# Patient Record
Sex: FEMALE | Race: WHITE | Employment: OTHER | URBAN - METROPOLITAN AREA
[De-identification: names, ages, dates, MRNs, and addresses within clinical notes are randomized per-mention and may not be internally consistent; named-entity substitution may affect disease eponyms.]

---

## 2018-04-10 ENCOUNTER — OFFICE VISIT (OUTPATIENT)
Dept: CARDIOLOGY CLINIC | Age: 78
End: 2018-04-10
Payer: MEDICARE

## 2018-04-10 VITALS
WEIGHT: 193 LBS | RESPIRATION RATE: 12 BRPM | HEART RATE: 52 BPM | SYSTOLIC BLOOD PRESSURE: 120 MMHG | HEIGHT: 61 IN | BODY MASS INDEX: 36.44 KG/M2 | DIASTOLIC BLOOD PRESSURE: 80 MMHG

## 2018-04-10 DIAGNOSIS — I10 ESSENTIAL HYPERTENSION: Primary | ICD-10-CM

## 2018-04-10 DIAGNOSIS — I25.83 CORONARY ARTERY DISEASE DUE TO LIPID RICH PLAQUE: ICD-10-CM

## 2018-04-10 DIAGNOSIS — I25.10 CORONARY ARTERY DISEASE DUE TO LIPID RICH PLAQUE: ICD-10-CM

## 2018-04-10 PROCEDURE — 93000 ELECTROCARDIOGRAM COMPLETE: CPT | Performed by: INTERNAL MEDICINE

## 2018-04-10 PROCEDURE — G8427 DOCREV CUR MEDS BY ELIG CLIN: HCPCS | Performed by: INTERNAL MEDICINE

## 2018-04-10 PROCEDURE — G8598 ASA/ANTIPLAT THER USED: HCPCS | Performed by: INTERNAL MEDICINE

## 2018-04-10 PROCEDURE — 99214 OFFICE O/P EST MOD 30 MIN: CPT | Performed by: INTERNAL MEDICINE

## 2018-04-10 PROCEDURE — 4040F PNEUMOC VAC/ADMIN/RCVD: CPT | Performed by: INTERNAL MEDICINE

## 2018-04-10 PROCEDURE — 1090F PRES/ABSN URINE INCON ASSESS: CPT | Performed by: INTERNAL MEDICINE

## 2018-04-10 PROCEDURE — 1036F TOBACCO NON-USER: CPT | Performed by: INTERNAL MEDICINE

## 2018-04-10 PROCEDURE — G8417 CALC BMI ABV UP PARAM F/U: HCPCS | Performed by: INTERNAL MEDICINE

## 2018-04-10 PROCEDURE — 3017F COLORECTAL CA SCREEN DOC REV: CPT | Performed by: INTERNAL MEDICINE

## 2018-04-10 PROCEDURE — 1123F ACP DISCUSS/DSCN MKR DOCD: CPT | Performed by: INTERNAL MEDICINE

## 2018-04-10 PROCEDURE — G8400 PT W/DXA NO RESULTS DOC: HCPCS | Performed by: INTERNAL MEDICINE

## 2018-04-10 RX ORDER — CLOPIDOGREL BISULFATE 75 MG/1
75 TABLET ORAL DAILY
Qty: 90 TABLET | Refills: 3 | Status: SHIPPED | OUTPATIENT
Start: 2018-04-10 | End: 2019-04-05 | Stop reason: SDUPTHER

## 2019-04-09 RX ORDER — CLOPIDOGREL BISULFATE 75 MG/1
TABLET ORAL
Qty: 90 TABLET | Refills: 3 | Status: SHIPPED
Start: 2019-04-09 | End: 2020-04-01

## 2019-04-16 ENCOUNTER — OFFICE VISIT (OUTPATIENT)
Dept: CARDIOLOGY CLINIC | Age: 79
End: 2019-04-16
Payer: MEDICARE

## 2019-04-16 VITALS
WEIGHT: 193.2 LBS | BODY MASS INDEX: 35.55 KG/M2 | SYSTOLIC BLOOD PRESSURE: 100 MMHG | DIASTOLIC BLOOD PRESSURE: 60 MMHG | RESPIRATION RATE: 16 BRPM | HEIGHT: 62 IN | HEART RATE: 52 BPM

## 2019-04-16 DIAGNOSIS — I25.83 CORONARY ARTERY DISEASE DUE TO LIPID RICH PLAQUE: Primary | ICD-10-CM

## 2019-04-16 DIAGNOSIS — I25.10 CORONARY ARTERY DISEASE DUE TO LIPID RICH PLAQUE: Primary | ICD-10-CM

## 2019-04-16 PROCEDURE — 4040F PNEUMOC VAC/ADMIN/RCVD: CPT | Performed by: INTERNAL MEDICINE

## 2019-04-16 PROCEDURE — 1123F ACP DISCUSS/DSCN MKR DOCD: CPT | Performed by: INTERNAL MEDICINE

## 2019-04-16 PROCEDURE — G8400 PT W/DXA NO RESULTS DOC: HCPCS | Performed by: INTERNAL MEDICINE

## 2019-04-16 PROCEDURE — 99213 OFFICE O/P EST LOW 20 MIN: CPT | Performed by: INTERNAL MEDICINE

## 2019-04-16 PROCEDURE — G8427 DOCREV CUR MEDS BY ELIG CLIN: HCPCS | Performed by: INTERNAL MEDICINE

## 2019-04-16 PROCEDURE — G8598 ASA/ANTIPLAT THER USED: HCPCS | Performed by: INTERNAL MEDICINE

## 2019-04-16 PROCEDURE — G8417 CALC BMI ABV UP PARAM F/U: HCPCS | Performed by: INTERNAL MEDICINE

## 2019-04-16 PROCEDURE — 1036F TOBACCO NON-USER: CPT | Performed by: INTERNAL MEDICINE

## 2019-04-16 PROCEDURE — 1090F PRES/ABSN URINE INCON ASSESS: CPT | Performed by: INTERNAL MEDICINE

## 2019-04-16 PROCEDURE — 93000 ELECTROCARDIOGRAM COMPLETE: CPT | Performed by: INTERNAL MEDICINE

## 2019-04-16 RX ORDER — NITROGLYCERIN 0.4 MG/1
0.4 TABLET SUBLINGUAL EVERY 5 MIN PRN
Qty: 25 TABLET | Refills: 3 | Status: ON HOLD
Start: 2019-04-16 | End: 2022-08-10

## 2019-04-16 RX ORDER — IRBESARTAN 150 MG/1
150 TABLET ORAL NIGHTLY
Status: ON HOLD | COMMUNITY
End: 2022-08-10

## 2019-04-17 NOTE — PROGRESS NOTES
Neto Barrett  1940  Date of Service: 4/17/2019    Patient Active Problem List    Diagnosis Date Noted    Hypertension     Diabetes mellitus ([facility] [address]     CAD (coronary artery disease) 08/12/2015     Overview Note:     7-2015  2.75 x 18-mm Xience drug-eluting stent to the ostial and proximal 1st diagonal.  2.75 x 18-mm Xience drug-eluting stent to the proximal and mid LAD.    5-16   3.0x18 KARINE prox LAD      Hyperlipidemia 08/12/2015    Cholecystitis 12/09/2013       Social History     Socioeconomic History    Marital status:       Spouse name: None    Number of children: None    Years of education: None    Highest education level: None   Occupational History    None   Social Needs    Financial resource strain: None    Food insecurity:     Worry: None     Inability: None    Transportation needs:     Medical: None     Non-medical: None   Tobacco Use    Smoking status: Former Smoker     Types: Cigarettes    Smokeless tobacco: Never Used   Substance and Sexual Activity    Alcohol use: No    Drug use: No    Sexual activity: None   Lifestyle    Physical activity:     Days per week: None     Minutes per session: None    Stress: None   Relationships    Social connections:     Talks on phone: None     Gets together: None     Attends Christianity service: None     Active member of club or organization: None     Attends meetings of clubs or organizations: None     Relationship status: None    Intimate partner violence:     Fear of current or ex partner: None     Emotionally abused: None     Physically abused: None     Forced sexual activity: None   Other Topics Concern    None   Social History Narrative    None       Current Outpatient Medications   Medication Sig Dispense Refill    irbesartan (AVAPRO) 150 MG tablet Take 150 mg by mouth nightly      Insulin Degludec (TRESIBA FLEXTOUCH) 200 UNIT/ML SOPN Inject into the skin      nitroGLYCERIN (NITROSTAT) 0.4 MG SL tablet Place 1 tablet No masses, trachea is mid position. Supple, full ROM, no JVD or bruits. No palpable thyromegaly or lymphadenopathy. HEART:  Regular rate and rhythm. Normal S1 and S2. There is an S4 gallop and a I/VI (normal physiologic) systolic murmur. LUNGS:  Clear to auscultation bilaterally. No use of accessory muscles. symmetrical excursion. ABDOMEN:  Soft, non-tender. Normal bowel sounds. Obese. EXTREMITIES:  Full ROM x 4. No bilateral lower extremity edema. Good distal pulses. EYES:  Extraocular muscles intact. PERRL. Normal lids & conjunctiva. ENT:  Nares are clear & not bleeding. Moist mucosa. Normal lips formation. No external masses   NEURO: no tremors, full ROM x 4, EOMI. SKIN:  Warm, dry and intact. Normal turgor. EKG: Sinus, 52 bpm, nl axis, nonspecific ST - T wave changes. Inferior q-waves. Poor R-wave progression. Assessment:   1. Coronary artery disease as outlined above. She denies any ischemic symptoms at this time. 2. Hypertension, a little too low today. 3. Hypercholesterolemia  4. DM        Recommendations:  1. She is following the cholesterol with Dr. Magalis Sullivan. 2. Discontinue the Imdur      Thank you for allowing me to participate in your patient's care.       [id number], [address] [facility]  Interventional Cardiology

## 2020-04-01 RX ORDER — CLOPIDOGREL BISULFATE 75 MG/1
TABLET ORAL
Qty: 90 TABLET | Refills: 3 | Status: ON HOLD
Start: 2020-04-01 | End: 2022-04-21 | Stop reason: HOSPADM

## 2020-05-06 ENCOUNTER — TELEPHONE (OUTPATIENT)
Dept: CARDIOLOGY CLINIC | Age: 80
End: 2020-05-06

## 2020-07-29 ENCOUNTER — OFFICE VISIT (OUTPATIENT)
Dept: CARDIOLOGY CLINIC | Age: 80
End: 2020-07-29
Payer: MEDICARE

## 2020-07-29 VITALS
RESPIRATION RATE: 18 BRPM | WEIGHT: 182 LBS | HEART RATE: 54 BPM | DIASTOLIC BLOOD PRESSURE: 58 MMHG | HEIGHT: 62 IN | BODY MASS INDEX: 33.49 KG/M2 | SYSTOLIC BLOOD PRESSURE: 122 MMHG

## 2020-07-29 PROCEDURE — 4040F PNEUMOC VAC/ADMIN/RCVD: CPT | Performed by: INTERNAL MEDICINE

## 2020-07-29 PROCEDURE — 1036F TOBACCO NON-USER: CPT | Performed by: INTERNAL MEDICINE

## 2020-07-29 PROCEDURE — G8417 CALC BMI ABV UP PARAM F/U: HCPCS | Performed by: INTERNAL MEDICINE

## 2020-07-29 PROCEDURE — 99213 OFFICE O/P EST LOW 20 MIN: CPT | Performed by: INTERNAL MEDICINE

## 2020-07-29 PROCEDURE — G8400 PT W/DXA NO RESULTS DOC: HCPCS | Performed by: INTERNAL MEDICINE

## 2020-07-29 PROCEDURE — 93000 ELECTROCARDIOGRAM COMPLETE: CPT | Performed by: INTERNAL MEDICINE

## 2020-07-29 PROCEDURE — 1090F PRES/ABSN URINE INCON ASSESS: CPT | Performed by: INTERNAL MEDICINE

## 2020-07-29 PROCEDURE — G8427 DOCREV CUR MEDS BY ELIG CLIN: HCPCS | Performed by: INTERNAL MEDICINE

## 2020-07-29 PROCEDURE — 1123F ACP DISCUSS/DSCN MKR DOCD: CPT | Performed by: INTERNAL MEDICINE

## 2020-07-29 NOTE — PROGRESS NOTES
Tony Lopez  1940  Date of Service: 7/29/2020    Patient Active Problem List    Diagnosis Date Noted    Hypertension     Diabetes mellitus ([facility] [address]     CAD (coronary artery disease) 08/12/2015     Overview Note:     7-2015  2.75 x 18-mm Xience drug-eluting stent to the ostial and proximal 1st diagonal.  2.75 x 18-mm Xience drug-eluting stent to the proximal and mid LAD.    5-16   3.0x18 KARINE prox LAD      Hyperlipidemia 08/12/2015    Cholecystitis 12/09/2013       Social History     Socioeconomic History    Marital status:       Spouse name: None    Number of children: None    Years of education: None    Highest education level: None   Occupational History    None   Social Needs    Financial resource strain: None    Food insecurity     Worry: None     Inability: None    Transportation needs     Medical: None     Non-medical: None   Tobacco Use    Smoking status: Former Smoker     Types: Cigarettes    Smokeless tobacco: Never Used   Substance and Sexual Activity    Alcohol use: No    Drug use: No    Sexual activity: None   Lifestyle    Physical activity     Days per week: None     Minutes per session: None    Stress: None   Relationships    Social connections     Talks on phone: None     Gets together: None     Attends Lutheran service: None     Active member of club or organization: None     Attends meetings of clubs or organizations: None     Relationship status: None    Intimate partner violence     Fear of current or ex partner: None     Emotionally abused: None     Physically abused: None     Forced sexual activity: None   Other Topics Concern    None   Social History Narrative    None       Current Outpatient Medications   Medication Sig Dispense Refill    clopidogrel (PLAVIX) 75 MG tablet TAKE 1 TABLET BY MOUTH ONCE A DAY 90 tablet 3    irbesartan (AVAPRO) 150 MG tablet Take 150 mg by mouth nightly      Insulin Degludec (TRESIBA FLEXTOUCH) 200 UNIT/ML SOPN Inject 70 Units into the skin daily       nitroGLYCERIN (NITROSTAT) 0.4 MG SL tablet Place 1 tablet under the tongue every 5 minutes as needed for Chest pain 25 tablet 3    insulin lispro (HUMALOG KWIKPEN) 100 UNIT/ML pen Inject into the skin 3 times daily (before meals)      INVOKANA 300 MG TABS tablet       atenolol (TENORMIN) 50 MG tablet Take 1 tablet by mouth daily 30 tablet 3    ALPRAZolam (XANAX) 1 MG tablet Take 1 mg by mouth nightly as needed for Sleep      atorvastatin (LIPITOR) 40 MG tablet Take 40 mg by mouth daily       aspirin 81 MG EC tablet Take 1 tablet by mouth daily 30 tablet 3    metFORMIN (GLUCOPHAGE) 1000 MG tablet Take 1 tablet by mouth 2 times daily (with meals). 60 tablet 1    citalopram (CELEXA) 20 MG tablet Take 40 mg by mouth daily        No current facility-administered medications for this visit. Allergies   Allergen Reactions    Pcn [Penicillins] Hives    Zocor [Simvastatin] Hives       Chief Complaint:  Merrick Angel is here today for follow up and management/recomendations for CAD     History of Present Illness: Merrick Angel states that She does house work, does yard work, goes up the stairs, & goes shopping. She denies any chest pain, dyspnea on exertion, orthopnea/PND, or lower extremity edema. She denies any palpitations or presyncopal symptoms. REVIEW OF SYSTEMS:  As above. Patient does not complain of any fever, chills, nausea, vomiting or diarrhea. No focal, motor or neurological deficits. No changes in his/her vision, hearing, bowel or bladder habits. She is not known to have a history of thyroid problems. No recent nose bleeds. PHYSICAL EXAM:  Vitals:    07/29/20 1041   BP: (!) 122/58   Pulse: 54   Resp: 18   Weight: 182 lb (82.6 kg)   Height: 5' 2\" (1.575 m)       GENERAL:  She is alert and oriented x 3, communicates well, in no distress. NECK:  No masses, trachea is mid position. Supple, full ROM, no JVD or bruits.   No palpable thyromegaly or lymphadenopathy. HEART:  Regular rate and rhythm. Normal S1 and S2. There are no abnormal murmurs. Dariana Hines LUNGS:  Clear to auscultation bilaterally. No use of accessory muscles. symmetrical excursion. Good effort. ABDOMEN: Obese. Soft, non-tender. Normal bowel sounds. EXTREMITIES:  Full ROM x 4. No bilateral lower extremity edema. Good distal pulses. EYES:  Extraocular muscles intact. PERRL. Normal lids & conjunctiva. ENT:  Nares are clear & not bleeding. Moist mucosa. Normal lips formation. No external masses   NEURO: no tremors, full ROM x 4, EOMI. SKIN:  Warm, dry and intact. Normal turgor. EKG: Sinus, 54 bpm, nl axis, inferior Q waves. Nonspecific ST - T wave changes. Poor R-wave progression. Assessment:   1. Coronary artery disease as outlined above. No ischemic symptoms at this time. 2. Hypertension, well controlled today. 3. Hypercholesterolemia  4. DM        Recommendations:  1. She is following the cholesterol with Dr. Harini Wilson. 2. Discontinue the aspirin. Continue the Plavix. Thank you for allowing me to participate in your patient's care.       [id number], [address] [facility]  Interventional Cardiology

## 2021-09-27 ENCOUNTER — OFFICE VISIT (OUTPATIENT)
Dept: CARDIOLOGY CLINIC | Age: 81
End: 2021-09-27
Payer: MEDICARE

## 2021-09-27 VITALS
BODY MASS INDEX: 33.99 KG/M2 | SYSTOLIC BLOOD PRESSURE: 118 MMHG | WEIGHT: 180 LBS | HEART RATE: 53 BPM | RESPIRATION RATE: 14 BRPM | DIASTOLIC BLOOD PRESSURE: 62 MMHG | HEIGHT: 61 IN

## 2021-09-27 DIAGNOSIS — I25.10 CORONARY ARTERY DISEASE DUE TO LIPID RICH PLAQUE: Primary | ICD-10-CM

## 2021-09-27 DIAGNOSIS — I25.83 CORONARY ARTERY DISEASE DUE TO LIPID RICH PLAQUE: Primary | ICD-10-CM

## 2021-09-27 PROCEDURE — G8417 CALC BMI ABV UP PARAM F/U: HCPCS | Performed by: INTERNAL MEDICINE

## 2021-09-27 PROCEDURE — 99213 OFFICE O/P EST LOW 20 MIN: CPT | Performed by: INTERNAL MEDICINE

## 2021-09-27 PROCEDURE — 93000 ELECTROCARDIOGRAM COMPLETE: CPT | Performed by: INTERNAL MEDICINE

## 2021-09-27 PROCEDURE — 1036F TOBACCO NON-USER: CPT | Performed by: INTERNAL MEDICINE

## 2021-09-27 PROCEDURE — 4040F PNEUMOC VAC/ADMIN/RCVD: CPT | Performed by: INTERNAL MEDICINE

## 2021-09-27 PROCEDURE — G8400 PT W/DXA NO RESULTS DOC: HCPCS | Performed by: INTERNAL MEDICINE

## 2021-09-27 PROCEDURE — 1123F ACP DISCUSS/DSCN MKR DOCD: CPT | Performed by: INTERNAL MEDICINE

## 2021-09-27 PROCEDURE — G8427 DOCREV CUR MEDS BY ELIG CLIN: HCPCS | Performed by: INTERNAL MEDICINE

## 2021-09-27 PROCEDURE — 1090F PRES/ABSN URINE INCON ASSESS: CPT | Performed by: INTERNAL MEDICINE

## 2021-09-27 RX ORDER — ALPRAZOLAM 0.5 MG/1
1 TABLET ORAL 2 TIMES DAILY
Status: ON HOLD | COMMUNITY
Start: 2021-09-21 | End: 2022-08-16 | Stop reason: HOSPADM

## 2021-09-27 RX ORDER — FLUCONAZOLE 150 MG/1
TABLET ORAL
COMMUNITY
Start: 2021-09-07 | End: 2021-09-27

## 2021-09-27 NOTE — PROGRESS NOTES
Obie Virginia  1940  Date of Service: 9/27/2021    Patient Active Problem List    Diagnosis Date Noted    Hypertension     Diabetes mellitus ([facility] [address]     CAD (coronary artery disease) 08/12/2015     Overview Note:     7-2015  2.75 x 18-mm Xience drug-eluting stent to the ostial and proximal 1st diagonal.  2.75 x 18-mm Xience drug-eluting stent to the proximal and mid LAD.    5-16   3.0x18 KARINE prox LAD      Hyperlipidemia 08/12/2015    Cholecystitis 12/09/2013       Social History     Socioeconomic History    Marital status:      Spouse name: None    Number of children: None    Years of education: None    Highest education level: None   Occupational History    None   Tobacco Use    Smoking status: Former Smoker     Types: Cigarettes    Smokeless tobacco: Never Used   Vaping Use    Vaping Use: Never used   Substance and Sexual Activity    Alcohol use: No    Drug use: No    Sexual activity: None   Other Topics Concern    None   Social History Narrative    None     Social Determinants of Health     Financial Resource Strain:     Difficulty of Paying Living Expenses:    Food Insecurity:     Worried About Running Out of Food in the Last Year:     Ran Out of Food in the Last Year:    Transportation Needs:     Lack of Transportation (Medical):      Lack of Transportation (Non-Medical):    Physical Activity:     Days of Exercise per Week:     Minutes of Exercise per Session:    Stress:     Feeling of Stress :    Social Connections:     Frequency of Communication with Friends and Family:     Frequency of Social Gatherings with Friends and Family:     Attends [employer] Services:     Active Member of Clubs or Organizations:     Attends Club or Organization Meetings:     Marital Status:    Intimate Partner Violence:     Fear of Current or Ex-Partner:     Emotionally Abused:     Physically Abused:     Sexually Abused:        Current Outpatient Medications   Medication Sig Dispense Refill    ALPRAZolam (XANAX) 0.5 MG tablet TAKE 1/2 TABLET BY MOUTH EVERY 12 HOURS FOR 90 DAYS      clopidogrel (PLAVIX) 75 MG tablet TAKE 1 TABLET BY MOUTH ONCE A DAY 90 tablet 3    irbesartan (AVAPRO) 150 MG tablet Take 150 mg by mouth nightly      Insulin Degludec (TRESIBA FLEXTOUCH) 200 UNIT/ML SOPN Inject 70 Units into the skin daily       nitroGLYCERIN (NITROSTAT) 0.4 MG SL tablet Place 1 tablet under the tongue every 5 minutes as needed for Chest pain 25 tablet 3    insulin lispro (HUMALOG KWIKPEN) 100 UNIT/ML pen Inject into the skin 3 times daily (before meals)      INVOKANA 300 MG TABS tablet       atenolol (TENORMIN) 50 MG tablet Take 1 tablet by mouth daily 30 tablet 3    atorvastatin (LIPITOR) 40 MG tablet Take 40 mg by mouth daily       metFORMIN (GLUCOPHAGE) 1000 MG tablet Take 1 tablet by mouth 2 times daily (with meals). 60 tablet 1    citalopram (CELEXA) 20 MG tablet Take 40 mg by mouth daily       fluconazole (DIFLUCAN) 150 MG tablet TAKE 1 TABLET BY MOUTH  REPEAT EVERY 3 DAYS (Patient not taking: Reported on 9/27/2021)       No current facility-administered medications for this visit. Allergies   Allergen Reactions    Pcn [Penicillins] Hives    Zocor [Simvastatin] Hives       Chief Complaint:  Maximiliano Lopez is here today for follow up and management/recomendations for CAD     History of Present Illness: Maximiliano Lopez states that She does house work, does yard work, goes up the stairs, & goes shopping. She also just ran a retreat this weekend where she had to do a lot of walking, pulling wagons up inclines, etc.   She denies any chest pain, dyspnea on exertion, orthopnea/PND, or lower extremity edema. She denies any palpitations or presyncopal symptoms. REVIEW OF SYSTEMS:  As above. Patient does not complain of any fever, chills, nausea, vomiting or diarrhea. No focal, motor or neurological deficits.  No changes in his/her vision, hearing, bowel or bladder habits. She is not known to have a history of thyroid problems. No recent nose bleeds. PHYSICAL EXAM:  Vitals:    09/27/21 0947   BP: 118/62   Pulse: 53   Resp: 14   Weight: 180 lb (81.6 kg)   Height: 5' 1\" (1.549 m)       GENERAL:  She is alert and oriented x 3, communicates well, in no distress. NECK:  No masses, trachea is mid position. Supple, full ROM, no JVD or bruits. No palpable thyromegaly or lymphadenopathy. HEART:  Regular rate and rhythm. Normal S1 and S2. There are no abnormal murmurs on exam..    LUNGS:  Clear to auscultation bilaterally. No use of accessory muscles. symmetrical excursion. ABDOMEN: Obese. Soft, non-tender. Normal bowel sounds. EXTREMITIES:  Full ROM x 4. No bilateral lower extremity edema. Good distal pulses. EYES:  Extraocular muscles intact. PERRL. Normal lids & conjunctiva. ENT:  Nares are clear & not bleeding. Moist mucosa. Normal lips formation. No external masses   NEURO: no tremors, full ROM x 4, EOMI. SKIN:  Warm, dry and intact. Normal turgor. EKG: Sinus, 53 bpm, nl axis, inferior Q waves. Nonspecific ST - T wave changes. Poor R-wave progression. Assessment:   1. Coronary artery disease as outlined above. No ischemic symptoms. 2. Hypertension, well controlled today. 3. Hypercholesterolemia  4. DM        Recommendations:  1. She is following the cholesterol with Dr. Lillian Talbert. 2. Continue her current cardiac medications. Thank you for allowing me to participate in your patient's care.       [address], [address] [facility]  Interventional Cardiology

## 2021-11-12 ENCOUNTER — HOSPITAL ENCOUNTER (OUTPATIENT)
Dept: INFUSION THERAPY | Age: 81
Setting detail: INFUSION SERIES
Discharge: HOME | End: 2021-11-12
Payer: MEDICARE

## 2021-11-12 VITALS
OXYGEN SATURATION: 95 % | HEART RATE: 54 BPM | DIASTOLIC BLOOD PRESSURE: 66 MMHG | RESPIRATION RATE: 16 BRPM | TEMPERATURE: 97.6 F | SYSTOLIC BLOOD PRESSURE: 140 MMHG

## 2021-11-12 DIAGNOSIS — U07.1 COVID-19: ICD-10-CM

## 2021-11-12 DIAGNOSIS — U07.1 COVID-19: Primary | ICD-10-CM

## 2021-11-12 PROCEDURE — M0243 CASIRIVI AND IMDEVI INFUSION: HCPCS

## 2021-11-12 PROCEDURE — 2580000003 HC RX 258: Performed by: INTERNAL MEDICINE

## 2021-11-12 PROCEDURE — 96365 THER/PROPH/DIAG IV INF INIT: CPT

## 2021-11-12 PROCEDURE — 6360000002 HC RX W HCPCS: Performed by: INTERNAL MEDICINE

## 2021-11-12 RX ORDER — SODIUM CHLORIDE 9 MG/ML
25 INJECTION, SOLUTION INTRAVENOUS PRN
OUTPATIENT
Start: 2021-11-12

## 2021-11-12 RX ORDER — SODIUM CHLORIDE 0.9 % (FLUSH) 0.9 %
5-40 SYRINGE (ML) INJECTION PRN
Status: DISCONTINUED | OUTPATIENT
Start: 2021-11-12 | End: 2021-11-13 | Stop reason: HOSPADM

## 2021-11-12 RX ORDER — METHYLPREDNISOLONE SODIUM SUCCINATE 125 MG/2ML
125 INJECTION, POWDER, LYOPHILIZED, FOR SOLUTION INTRAMUSCULAR; INTRAVENOUS ONCE
OUTPATIENT
Start: 2021-11-12 | End: 2021-11-12

## 2021-11-12 RX ORDER — HEPARIN SODIUM (PORCINE) LOCK FLUSH IV SOLN 100 UNIT/ML 100 UNIT/ML
500 SOLUTION INTRAVENOUS PRN
OUTPATIENT
Start: 2021-11-12

## 2021-11-12 RX ORDER — DIPHENHYDRAMINE HYDROCHLORIDE 50 MG/ML
50 INJECTION INTRAMUSCULAR; INTRAVENOUS ONCE
Status: CANCELLED | OUTPATIENT
Start: 2021-11-12 | End: 2021-11-12

## 2021-11-12 RX ORDER — EPINEPHRINE 1 MG/ML
0.3 INJECTION, SOLUTION, CONCENTRATE INTRAVENOUS PRN
OUTPATIENT
Start: 2021-11-12

## 2021-11-12 RX ORDER — HEPARIN SODIUM (PORCINE) LOCK FLUSH IV SOLN 100 UNIT/ML 100 UNIT/ML
500 SOLUTION INTRAVENOUS PRN
Status: CANCELLED | OUTPATIENT
Start: 2021-11-12

## 2021-11-12 RX ORDER — EPINEPHRINE 1 MG/ML
0.3 INJECTION, SOLUTION, CONCENTRATE INTRAVENOUS PRN
Status: CANCELLED | OUTPATIENT
Start: 2021-11-12

## 2021-11-12 RX ORDER — SODIUM CHLORIDE 9 MG/ML
INJECTION, SOLUTION INTRAVENOUS CONTINUOUS
Status: CANCELLED | OUTPATIENT
Start: 2021-11-12

## 2021-11-12 RX ORDER — SODIUM CHLORIDE 0.9 % (FLUSH) 0.9 %
5-40 SYRINGE (ML) INJECTION PRN
Status: CANCELLED | OUTPATIENT
Start: 2021-11-12

## 2021-11-12 RX ORDER — SODIUM CHLORIDE 9 MG/ML
25 INJECTION, SOLUTION INTRAVENOUS PRN
Status: CANCELLED | OUTPATIENT
Start: 2021-11-12

## 2021-11-12 RX ORDER — SODIUM CHLORIDE 9 MG/ML
INJECTION, SOLUTION INTRAVENOUS CONTINUOUS
OUTPATIENT
Start: 2021-11-12

## 2021-11-12 RX ORDER — METHYLPREDNISOLONE SODIUM SUCCINATE 125 MG/2ML
125 INJECTION, POWDER, LYOPHILIZED, FOR SOLUTION INTRAMUSCULAR; INTRAVENOUS ONCE
Status: CANCELLED | OUTPATIENT
Start: 2021-11-12 | End: 2021-11-12

## 2021-11-12 RX ORDER — DIPHENHYDRAMINE HYDROCHLORIDE 50 MG/ML
50 INJECTION INTRAMUSCULAR; INTRAVENOUS ONCE
OUTPATIENT
Start: 2021-11-12 | End: 2021-11-12

## 2021-11-12 RX ORDER — SODIUM CHLORIDE 9 MG/ML
INJECTION, SOLUTION INTRAVENOUS CONTINUOUS
Status: DISCONTINUED | OUTPATIENT
Start: 2021-11-12 | End: 2021-11-13 | Stop reason: HOSPADM

## 2021-11-12 RX ADMIN — SODIUM CHLORIDE, PRESERVATIVE FREE 10 ML: 5 INJECTION INTRAVENOUS at 18:21

## 2021-11-12 RX ADMIN — SODIUM CHLORIDE: 9 INJECTION, SOLUTION INTRAVENOUS at 17:45

## 2021-11-12 RX ADMIN — SODIUM CHLORIDE, PRESERVATIVE FREE 10 ML: 5 INJECTION INTRAVENOUS at 17:43

## 2021-11-12 RX ADMIN — CASIRIVIMAB AND IMDEVIMAB: 600; 600 INJECTION, SOLUTION, CONCENTRATE INTRAVENOUS at 17:47

## 2022-04-18 ENCOUNTER — APPOINTMENT (OUTPATIENT)
Dept: GENERAL RADIOLOGY | Age: 82
DRG: 378 | End: 2022-04-18
Payer: MEDICARE

## 2022-04-18 ENCOUNTER — HOSPITAL ENCOUNTER (INPATIENT)
Age: 82
LOS: 2 days | Discharge: HOME | DRG: 378 | End: 2022-04-21
Attending: EMERGENCY MEDICINE | Admitting: INTERNAL MEDICINE
Payer: MEDICARE

## 2022-04-18 DIAGNOSIS — K04.7 DENTAL ABSCESS: ICD-10-CM

## 2022-04-18 DIAGNOSIS — R42 DIZZINESS: ICD-10-CM

## 2022-04-18 DIAGNOSIS — D64.9 ANEMIA, UNSPECIFIED TYPE: ICD-10-CM

## 2022-04-18 DIAGNOSIS — R11.0 NAUSEA: Primary | ICD-10-CM

## 2022-04-18 PROBLEM — Z86.16 HISTORY OF COVID-19: Status: ACTIVE | Noted: 2021-11-12

## 2022-04-18 LAB
ALBUMIN SERPL-MCNC: 3.7 G/DL (ref 3.5–5.2)
ALP BLD-CCNC: 68 U/L (ref 35–104)
ALT SERPL-CCNC: 16 U/L (ref 0–32)
ANION GAP SERPL CALCULATED.3IONS-SCNC: 12 MMOL/L (ref 7–16)
AST SERPL-CCNC: 19 U/L (ref 0–31)
BASOPHILS ABSOLUTE: 0.05 E9/L (ref 0–0.2)
BASOPHILS RELATIVE PERCENT: 0.4 % (ref 0–2)
BILIRUB SERPL-MCNC: 0.3 MG/DL (ref 0–1.2)
BILIRUBIN DIRECT: <0.2 MG/DL (ref 0–0.3)
BILIRUBIN, INDIRECT: ABNORMAL MG/DL (ref 0–1)
BUN BLDV-MCNC: 59 MG/DL (ref 6–23)
CALCIUM SERPL-MCNC: 9.3 MG/DL (ref 8.6–10.2)
CHLORIDE BLD-SCNC: 102 MMOL/L (ref 98–107)
CO2: 24 MMOL/L (ref 22–29)
CREAT SERPL-MCNC: 1 MG/DL (ref 0.5–1)
EOSINOPHILS ABSOLUTE: 0.06 E9/L (ref 0.05–0.5)
EOSINOPHILS RELATIVE PERCENT: 0.5 % (ref 0–6)
GFR AFRICAN AMERICAN: >60
GFR NON-AFRICAN AMERICAN: 53 ML/MIN/1.73
GLUCOSE BLD-MCNC: 201 MG/DL (ref 74–99)
HCT VFR BLD CALC: 29.3 % (ref 34–48)
HCT VFR BLD CALC: 31.4 % (ref 34–48)
HEMOGLOBIN: 9 G/DL (ref 11.5–15.5)
HEMOGLOBIN: 9.9 G/DL (ref 11.5–15.5)
IMMATURE GRANULOCYTES #: 0.05 E9/L
IMMATURE GRANULOCYTES %: 0.4 % (ref 0–5)
LIPASE: 28 U/L (ref 13–60)
LYMPHOCYTES ABSOLUTE: 2.09 E9/L (ref 1.5–4)
LYMPHOCYTES RELATIVE PERCENT: 16.9 % (ref 20–42)
MCH RBC QN AUTO: 29.6 PG (ref 26–35)
MCHC RBC AUTO-ENTMCNC: 31.5 % (ref 32–34.5)
MCV RBC AUTO: 93.7 FL (ref 80–99.9)
MONOCYTES ABSOLUTE: 0.47 E9/L (ref 0.1–0.95)
MONOCYTES RELATIVE PERCENT: 3.8 % (ref 2–12)
NEUTROPHILS ABSOLUTE: 9.67 E9/L (ref 1.8–7.3)
NEUTROPHILS RELATIVE PERCENT: 78 % (ref 43–80)
PDW BLD-RTO: 14.4 FL (ref 11.5–15)
PLATELET # BLD: 215 E9/L (ref 130–450)
PMV BLD AUTO: 10.5 FL (ref 7–12)
POTASSIUM SERPL-SCNC: 4.9 MMOL/L (ref 3.5–5)
RBC # BLD: 3.35 E12/L (ref 3.5–5.5)
SODIUM BLD-SCNC: 138 MMOL/L (ref 132–146)
TOTAL PROTEIN: 5.9 G/DL (ref 6.4–8.3)
TROPONIN, HIGH SENSITIVITY: 13 NG/L (ref 0–9)
WBC # BLD: 12.4 E9/L (ref 4.5–11.5)

## 2022-04-18 PROCEDURE — 85014 HEMATOCRIT: CPT

## 2022-04-18 PROCEDURE — 83690 ASSAY OF LIPASE: CPT

## 2022-04-18 PROCEDURE — 84484 ASSAY OF TROPONIN QUANT: CPT

## 2022-04-18 PROCEDURE — 85025 COMPLETE CBC W/AUTO DIFF WBC: CPT

## 2022-04-18 PROCEDURE — 80076 HEPATIC FUNCTION PANEL: CPT

## 2022-04-18 PROCEDURE — 93005 ELECTROCARDIOGRAM TRACING: CPT | Performed by: EMERGENCY MEDICINE

## 2022-04-18 PROCEDURE — 99285 EMERGENCY DEPT VISIT HI MDM: CPT

## 2022-04-18 PROCEDURE — 85018 HEMOGLOBIN: CPT

## 2022-04-18 PROCEDURE — 80048 BASIC METABOLIC PNL TOTAL CA: CPT

## 2022-04-18 PROCEDURE — 6360000002 HC RX W HCPCS: Performed by: EMERGENCY MEDICINE

## 2022-04-18 PROCEDURE — 96374 THER/PROPH/DIAG INJ IV PUSH: CPT

## 2022-04-18 PROCEDURE — 71045 X-RAY EXAM CHEST 1 VIEW: CPT

## 2022-04-18 RX ORDER — ALPRAZOLAM 0.25 MG/1
0.5 TABLET ORAL ONCE
Status: COMPLETED | OUTPATIENT
Start: 2022-04-19 | End: 2022-04-19

## 2022-04-18 RX ORDER — SODIUM CHLORIDE 0.9 % (FLUSH) 0.9 %
10 SYRINGE (ML) INJECTION PRN
Status: DISCONTINUED | OUTPATIENT
Start: 2022-04-18 | End: 2022-04-19

## 2022-04-18 RX ORDER — ACETAMINOPHEN 325 MG/1
650 TABLET ORAL ONCE
Status: COMPLETED | OUTPATIENT
Start: 2022-04-19 | End: 2022-04-19

## 2022-04-18 RX ORDER — ONDANSETRON 2 MG/ML
4 INJECTION INTRAMUSCULAR; INTRAVENOUS ONCE
Status: COMPLETED | OUTPATIENT
Start: 2022-04-18 | End: 2022-04-18

## 2022-04-18 RX ADMIN — ONDANSETRON 4 MG: 2 INJECTION INTRAMUSCULAR; INTRAVENOUS at 20:52

## 2022-04-18 ASSESSMENT — ENCOUNTER SYMPTOMS
EYE PAIN: 0
ABDOMINAL PAIN: 0
DIARRHEA: 0
VOMITING: 0
COUGH: 0
SINUS PRESSURE: 0
SORE THROAT: 0
NAUSEA: 1
EYE REDNESS: 0
ABDOMINAL DISTENTION: 0
SHORTNESS OF BREATH: 0
WHEEZING: 0
EYE DISCHARGE: 0
BACK PAIN: 0

## 2022-04-19 ENCOUNTER — ANESTHESIA EVENT (OUTPATIENT)
Dept: ENDOSCOPY | Age: 82
DRG: 378 | End: 2022-04-19
Payer: MEDICARE

## 2022-04-19 PROBLEM — R19.5 STOOL GUAIAC POSITIVE: Status: ACTIVE | Noted: 2022-04-19

## 2022-04-19 LAB
ANION GAP SERPL CALCULATED.3IONS-SCNC: 10 MMOL/L (ref 7–16)
BUN BLDV-MCNC: 74 MG/DL (ref 6–23)
CALCIUM SERPL-MCNC: 9.6 MG/DL (ref 8.6–10.2)
CHLORIDE BLD-SCNC: 103 MMOL/L (ref 98–107)
CO2: 26 MMOL/L (ref 22–29)
CREAT SERPL-MCNC: 1 MG/DL (ref 0.5–1)
EKG ATRIAL RATE: 63 BPM
EKG P AXIS: 29 DEGREES
EKG P-R INTERVAL: 150 MS
EKG Q-T INTERVAL: 438 MS
EKG QRS DURATION: 80 MS
EKG QTC CALCULATION (BAZETT): 448 MS
EKG R AXIS: -49 DEGREES
EKG T AXIS: 61 DEGREES
EKG VENTRICULAR RATE: 63 BPM
GFR AFRICAN AMERICAN: >60
GFR NON-AFRICAN AMERICAN: 53 ML/MIN/1.73
GLUCOSE BLD-MCNC: 149 MG/DL (ref 74–99)
HBA1C MFR BLD: 6.3 % (ref 4–5.6)
HCT VFR BLD CALC: 25 % (ref 34–48)
HCT VFR BLD CALC: 28.9 % (ref 34–48)
HEMOGLOBIN: 7.8 G/DL (ref 11.5–15.5)
HEMOGLOBIN: 9.1 G/DL (ref 11.5–15.5)
IRON SATURATION: 24 % (ref 15–50)
IRON: 60 MCG/DL (ref 37–145)
METER GLUCOSE: 122 MG/DL (ref 74–99)
METER GLUCOSE: 129 MG/DL (ref 74–99)
METER GLUCOSE: 133 MG/DL (ref 74–99)
METER GLUCOSE: 150 MG/DL (ref 74–99)
METER GLUCOSE: 164 MG/DL (ref 74–99)
POTASSIUM SERPL-SCNC: 4.5 MMOL/L (ref 3.5–5)
SODIUM BLD-SCNC: 139 MMOL/L (ref 132–146)
TOTAL IRON BINDING CAPACITY: 252 MCG/DL (ref 250–450)
VITAMIN B-12: 188 PG/ML (ref 211–946)

## 2022-04-19 PROCEDURE — 6370000000 HC RX 637 (ALT 250 FOR IP): Performed by: FAMILY MEDICINE

## 2022-04-19 PROCEDURE — 6360000002 HC RX W HCPCS: Performed by: FAMILY MEDICINE

## 2022-04-19 PROCEDURE — 80048 BASIC METABOLIC PNL TOTAL CA: CPT

## 2022-04-19 PROCEDURE — 83540 ASSAY OF IRON: CPT

## 2022-04-19 PROCEDURE — 36415 COLL VENOUS BLD VENIPUNCTURE: CPT

## 2022-04-19 PROCEDURE — 85018 HEMOGLOBIN: CPT

## 2022-04-19 PROCEDURE — 83550 IRON BINDING TEST: CPT

## 2022-04-19 PROCEDURE — 82607 VITAMIN B-12: CPT

## 2022-04-19 PROCEDURE — 82962 GLUCOSE BLOOD TEST: CPT

## 2022-04-19 PROCEDURE — C9113 INJ PANTOPRAZOLE SODIUM, VIA: HCPCS | Performed by: FAMILY MEDICINE

## 2022-04-19 PROCEDURE — 93010 ELECTROCARDIOGRAM REPORT: CPT | Performed by: INTERNAL MEDICINE

## 2022-04-19 PROCEDURE — 85014 HEMATOCRIT: CPT

## 2022-04-19 PROCEDURE — A4216 STERILE WATER/SALINE, 10 ML: HCPCS | Performed by: FAMILY MEDICINE

## 2022-04-19 PROCEDURE — 2060000000 HC ICU INTERMEDIATE R&B

## 2022-04-19 PROCEDURE — 6370000000 HC RX 637 (ALT 250 FOR IP): Performed by: EMERGENCY MEDICINE

## 2022-04-19 PROCEDURE — 2580000003 HC RX 258: Performed by: FAMILY MEDICINE

## 2022-04-19 PROCEDURE — 83036 HEMOGLOBIN GLYCOSYLATED A1C: CPT

## 2022-04-19 RX ORDER — ONDANSETRON 2 MG/ML
4 INJECTION INTRAMUSCULAR; INTRAVENOUS EVERY 6 HOURS PRN
Status: DISCONTINUED | OUTPATIENT
Start: 2022-04-19 | End: 2022-04-21 | Stop reason: HOSPADM

## 2022-04-19 RX ORDER — AZITHROMYCIN 250 MG/1
250 TABLET, FILM COATED ORAL DAILY
Status: DISCONTINUED | OUTPATIENT
Start: 2022-04-20 | End: 2022-04-21 | Stop reason: HOSPADM

## 2022-04-19 RX ORDER — AZITHROMYCIN 250 MG/1
500 TABLET, FILM COATED ORAL DAILY
Status: COMPLETED | OUTPATIENT
Start: 2022-04-19 | End: 2022-04-19

## 2022-04-19 RX ORDER — INSULIN GLARGINE 100 [IU]/ML
56 INJECTION, SOLUTION SUBCUTANEOUS DAILY
Status: DISCONTINUED | OUTPATIENT
Start: 2022-04-19 | End: 2022-04-19

## 2022-04-19 RX ORDER — PANTOPRAZOLE SODIUM 40 MG/1
40 TABLET, DELAYED RELEASE ORAL
Status: DISCONTINUED | OUTPATIENT
Start: 2022-04-19 | End: 2022-04-19

## 2022-04-19 RX ORDER — CITALOPRAM 20 MG/1
40 TABLET ORAL DAILY
Status: DISCONTINUED | OUTPATIENT
Start: 2022-04-19 | End: 2022-04-21 | Stop reason: HOSPADM

## 2022-04-19 RX ORDER — POLYETHYLENE GLYCOL 3350 17 G/17G
17 POWDER, FOR SOLUTION ORAL DAILY PRN
Status: DISCONTINUED | OUTPATIENT
Start: 2022-04-19 | End: 2022-04-21 | Stop reason: HOSPADM

## 2022-04-19 RX ORDER — DEXTROSE MONOHYDRATE 50 MG/ML
100 INJECTION, SOLUTION INTRAVENOUS PRN
Status: DISCONTINUED | OUTPATIENT
Start: 2022-04-19 | End: 2022-04-21 | Stop reason: HOSPADM

## 2022-04-19 RX ORDER — ACETAMINOPHEN 325 MG/1
650 TABLET ORAL EVERY 6 HOURS PRN
Status: DISCONTINUED | OUTPATIENT
Start: 2022-04-19 | End: 2022-04-21 | Stop reason: HOSPADM

## 2022-04-19 RX ORDER — ATENOLOL 50 MG/1
50 TABLET ORAL DAILY
Status: DISCONTINUED | OUTPATIENT
Start: 2022-04-19 | End: 2022-04-21 | Stop reason: HOSPADM

## 2022-04-19 RX ORDER — NITROGLYCERIN 0.4 MG/1
0.4 TABLET SUBLINGUAL EVERY 5 MIN PRN
Status: DISCONTINUED | OUTPATIENT
Start: 2022-04-19 | End: 2022-04-21 | Stop reason: HOSPADM

## 2022-04-19 RX ORDER — NICOTINE POLACRILEX 4 MG
15 LOZENGE BUCCAL PRN
Status: DISCONTINUED | OUTPATIENT
Start: 2022-04-19 | End: 2022-04-21 | Stop reason: HOSPADM

## 2022-04-19 RX ORDER — ONDANSETRON 4 MG/1
4 TABLET, ORALLY DISINTEGRATING ORAL EVERY 8 HOURS PRN
Status: DISCONTINUED | OUTPATIENT
Start: 2022-04-19 | End: 2022-04-21 | Stop reason: HOSPADM

## 2022-04-19 RX ORDER — ACETAMINOPHEN 650 MG/1
650 SUPPOSITORY RECTAL EVERY 6 HOURS PRN
Status: DISCONTINUED | OUTPATIENT
Start: 2022-04-19 | End: 2022-04-21 | Stop reason: HOSPADM

## 2022-04-19 RX ORDER — SODIUM CHLORIDE 9 MG/ML
INJECTION, SOLUTION INTRAVENOUS CONTINUOUS
Status: DISCONTINUED | OUTPATIENT
Start: 2022-04-19 | End: 2022-04-21

## 2022-04-19 RX ORDER — DEXTROSE MONOHYDRATE 25 G/50ML
12.5 INJECTION, SOLUTION INTRAVENOUS PRN
Status: DISCONTINUED | OUTPATIENT
Start: 2022-04-19 | End: 2022-04-21 | Stop reason: HOSPADM

## 2022-04-19 RX ORDER — SODIUM CHLORIDE 0.9 % (FLUSH) 0.9 %
5-40 SYRINGE (ML) INJECTION PRN
Status: DISCONTINUED | OUTPATIENT
Start: 2022-04-19 | End: 2022-04-21 | Stop reason: HOSPADM

## 2022-04-19 RX ORDER — ALPRAZOLAM 0.25 MG/1
0.25 TABLET ORAL 2 TIMES DAILY PRN
Status: DISCONTINUED | OUTPATIENT
Start: 2022-04-19 | End: 2022-04-20

## 2022-04-19 RX ORDER — LOSARTAN POTASSIUM 50 MG/1
50 TABLET ORAL DAILY
Status: DISCONTINUED | OUTPATIENT
Start: 2022-04-19 | End: 2022-04-21 | Stop reason: HOSPADM

## 2022-04-19 RX ORDER — CLOPIDOGREL BISULFATE 75 MG/1
75 TABLET ORAL DAILY
Status: DISCONTINUED | OUTPATIENT
Start: 2022-04-19 | End: 2022-04-19

## 2022-04-19 RX ORDER — INSULIN GLARGINE 100 [IU]/ML
40 INJECTION, SOLUTION SUBCUTANEOUS DAILY
Status: DISCONTINUED | OUTPATIENT
Start: 2022-04-19 | End: 2022-04-21 | Stop reason: HOSPADM

## 2022-04-19 RX ORDER — SODIUM CHLORIDE 0.9 % (FLUSH) 0.9 %
5-40 SYRINGE (ML) INJECTION EVERY 12 HOURS SCHEDULED
Status: DISCONTINUED | OUTPATIENT
Start: 2022-04-19 | End: 2022-04-21 | Stop reason: HOSPADM

## 2022-04-19 RX ORDER — ATORVASTATIN CALCIUM 40 MG/1
40 TABLET, FILM COATED ORAL DAILY
Status: DISCONTINUED | OUTPATIENT
Start: 2022-04-19 | End: 2022-04-21 | Stop reason: HOSPADM

## 2022-04-19 RX ORDER — SODIUM CHLORIDE 9 MG/ML
INJECTION, SOLUTION INTRAVENOUS PRN
Status: DISCONTINUED | OUTPATIENT
Start: 2022-04-19 | End: 2022-04-21 | Stop reason: HOSPADM

## 2022-04-19 RX ADMIN — ENOXAPARIN SODIUM 40 MG: 40 INJECTION SUBCUTANEOUS at 08:11

## 2022-04-19 RX ADMIN — ATORVASTATIN CALCIUM 40 MG: 40 TABLET, FILM COATED ORAL at 08:10

## 2022-04-19 RX ADMIN — CITALOPRAM 40 MG: 20 TABLET, FILM COATED ORAL at 08:10

## 2022-04-19 RX ADMIN — ACETAMINOPHEN 650 MG: 325 TABLET ORAL at 15:17

## 2022-04-19 RX ADMIN — METFORMIN HYDROCHLORIDE 1000 MG: 1000 TABLET ORAL at 17:59

## 2022-04-19 RX ADMIN — ALPRAZOLAM 0.5 MG: 0.25 TABLET ORAL at 00:32

## 2022-04-19 RX ADMIN — Medication 10 ML: at 20:30

## 2022-04-19 RX ADMIN — INSULIN LISPRO 2 UNITS: 100 INJECTION, SOLUTION INTRAVENOUS; SUBCUTANEOUS at 01:50

## 2022-04-19 RX ADMIN — ACETAMINOPHEN 650 MG: 325 TABLET ORAL at 00:32

## 2022-04-19 RX ADMIN — SODIUM CHLORIDE, PRESERVATIVE FREE 40 MG: 5 INJECTION INTRAVENOUS at 08:09

## 2022-04-19 RX ADMIN — Medication 10 ML: at 08:13

## 2022-04-19 RX ADMIN — INSULIN LISPRO 2 UNITS: 100 INJECTION, SOLUTION INTRAVENOUS; SUBCUTANEOUS at 20:29

## 2022-04-19 RX ADMIN — AZITHROMYCIN 500 MG: 250 TABLET, FILM COATED ORAL at 08:10

## 2022-04-19 RX ADMIN — LOSARTAN POTASSIUM 50 MG: 50 TABLET, FILM COATED ORAL at 08:10

## 2022-04-19 RX ADMIN — SODIUM CHLORIDE: 9 INJECTION, SOLUTION INTRAVENOUS at 15:22

## 2022-04-19 RX ADMIN — SODIUM CHLORIDE: 9 INJECTION, SOLUTION INTRAVENOUS at 01:49

## 2022-04-19 RX ADMIN — ALPRAZOLAM 0.25 MG: 0.25 TABLET ORAL at 23:23

## 2022-04-19 RX ADMIN — PANTOPRAZOLE SODIUM 40 MG: 40 TABLET, DELAYED RELEASE ORAL at 07:02

## 2022-04-19 RX ADMIN — INSULIN GLARGINE 40 UNITS: 100 INJECTION, SOLUTION SUBCUTANEOUS at 08:14

## 2022-04-19 RX ADMIN — ACETAMINOPHEN 650 MG: 325 TABLET ORAL at 23:23

## 2022-04-19 RX ADMIN — METFORMIN HYDROCHLORIDE 1000 MG: 1000 TABLET ORAL at 08:10

## 2022-04-19 RX ADMIN — ATENOLOL 50 MG: 50 TABLET ORAL at 08:10

## 2022-04-19 RX ADMIN — ONDANSETRON 4 MG: 4 TABLET, ORALLY DISINTEGRATING ORAL at 08:09

## 2022-04-19 ASSESSMENT — PAIN SCALES - GENERAL
PAINLEVEL_OUTOF10: 3
PAINLEVEL_OUTOF10: 0
PAINLEVEL_OUTOF10: 5
PAINLEVEL_OUTOF10: 0
PAINLEVEL_OUTOF10: 0
PAINLEVEL_OUTOF10: 3
PAINLEVEL_OUTOF10: 0
PAINLEVEL_OUTOF10: 2

## 2022-04-19 ASSESSMENT — PAIN DESCRIPTION - ONSET: ONSET: ON-GOING

## 2022-04-19 ASSESSMENT — PAIN DESCRIPTION - DESCRIPTORS: DESCRIPTORS: ACHING;CONSTANT;THROBBING

## 2022-04-19 ASSESSMENT — PAIN DESCRIPTION - FREQUENCY: FREQUENCY: CONTINUOUS

## 2022-04-19 ASSESSMENT — PAIN DESCRIPTION - ORIENTATION: ORIENTATION: RIGHT;LOWER;POSTERIOR

## 2022-04-19 ASSESSMENT — PAIN - FUNCTIONAL ASSESSMENT: PAIN_FUNCTIONAL_ASSESSMENT: ACTIVITIES ARE NOT PREVENTED

## 2022-04-19 ASSESSMENT — PAIN DESCRIPTION - LOCATION: LOCATION: TEETH

## 2022-04-19 ASSESSMENT — PAIN DESCRIPTION - PROGRESSION: CLINICAL_PROGRESSION: NOT CHANGED

## 2022-04-19 ASSESSMENT — PAIN DESCRIPTION - PAIN TYPE: TYPE: ACUTE PAIN;CHRONIC PAIN

## 2022-04-19 NOTE — CONSULTS
Gastroenterology Consult Note   PETTY Gr NP-C with Kevin Lees M.D. Consult Note        Date of Service: 4/19/2022  Reason for Consult: Evaluation of guaiac positive stool  Requesting Physician: Dr. Mildred Mullins: Dizziness and queasy stomach    History Obtained From: Patient, EMR    HISTORY OF PRESENT ILLNESS:       Cain Armstrong is a 80 y.o. female with significant past medical history of diabetes, hyperlipidemia, hypertension, cholecystectomy in 2013, CHF, asthma and CAD on Plavix admitted via ED for dizziness and a queasy stomach. Pt reports she had tooth pulled approximately 5 days ago was started on clindamycin however stopped due to reports of queasy stomach. She reports it made her have an upset stomach leading to multiple bouts of emesis stating\" look like dried blood\". Prior to had been doing okay no history of GI bleed. Reports she was taking over-the-counter ibuprofen 600 mg 2-3 times daily for the last week due to tooth ache. No other changes in medications currently on Plavix. Bowel movements every other day to every 3 days described as brown denies melena or hematochezia. Denies fever, chills or unintentional weight loss. No reports of abdominal/epigastric pain or heartburn. Denies prior EGD, last colonoscopy 10 to 15 years ago denies polyps however no reports to confirm. Denies family history of colon or other GI cancers. Admission labs hemoglobin 9.9, hematocrit 31.4, WBC 12.4, BUN 59, occult stool positive. Consultation for Evaluation of guaiac positive stool. Currently, pt reports no abdominal pain, nausea or vomiting. No BM today. Hungry and wants to eat. Labs today hemoglobin 9.1.     Past Medical History:        Diagnosis Date    CAD (coronary artery disease)     Cataract (lens) fragments in eye following cataract surgery     [facility] mellitus ([facility] [address]     Hyperlipidemia     Hypertension     Pancreatitis      Past Surgical History: Procedure Laterality Date    APPENDECTOMY      CHOLECYSTECTOMY  12/2012    CORONARY ANGIOPLASTY WITH STENT PLACEMENT  7/31/2015    Alpine Xience stent 2.75x18 stent to Mid LAD and Diagonal artey    PTCA  5/27/2016    Dr. Sonia Arredondo  EF=60%  Alpine 3.0x 18 Prox LAD    TUBAL LIGATION       Current Medications:    Current Facility-Administered Medications: ALPRAZolam (XANAX) tablet 0.25 mg, 0.25 mg, Oral, BID PRN  atenolol (TENORMIN) tablet 50 mg, 50 mg, Oral, Daily  atorvastatin (LIPITOR) tablet 40 mg, 40 mg, Oral, Daily  citalopram (CELEXA) tablet 40 mg, 40 mg, Oral, Daily  canagliflozin (INVOKANA) tablet 300 mg, 300 mg, Oral, QAM AC  losartan (COZAAR) tablet 50 mg, 50 mg, Oral, Daily  metFORMIN (GLUCOPHAGE) tablet 1,000 mg, 1,000 mg, Oral, BID WC  nitroGLYCERIN (NITROSTAT) SL tablet 0.4 mg, 0.4 mg, SubLINGual, Q5 Min PRN  sodium chloride flush 0.9 % injection 5-40 mL, 5-40 mL, IntraVENous, 2 times per day  sodium chloride flush 0.9 % injection 5-40 mL, 5-40 mL, IntraVENous, PRN  0.9 % sodium chloride infusion, , IntraVENous, PRN  enoxaparin (LOVENOX) injection 40 mg, 40 mg, SubCUTAneous, Daily  ondansetron (ZOFRAN-ODT) disintegrating tablet 4 mg, 4 mg, Oral, Q8H PRN **OR** ondansetron (ZOFRAN) injection 4 mg, 4 mg, IntraVENous, Q6H PRN  polyethylene glycol (GLYCOLAX) packet 17 g, 17 g, Oral, Daily PRN  acetaminophen (TYLENOL) tablet 650 mg, 650 mg, Oral, Q6H PRN **OR** acetaminophen (TYLENOL) suppository 650 mg, 650 mg, Rectal, Q6H PRN  0.9 % sodium chloride infusion, , IntraVENous, Continuous  insulin lispro (HUMALOG) injection vial 0-18 Units, 0-18 Units, SubCUTAneous, TID WC  insulin lispro (HUMALOG) injection vial 0-9 Units, 0-9 Units, SubCUTAneous, Nightly  glucose (GLUTOSE) 40 % oral gel 15 g, 15 g, Oral, PRN  dextrose 50 % IV solution, 12.5 g, IntraVENous, PRN  glucagon (rDNA) injection 1 mg, 1 mg, IntraMUSCular, PRN  dextrose 5 % solution, 100 mL/hr, IntraVENous, PRN  [COMPLETED] azithromycin (ZITHROMAX) tablet 500 mg, 500 mg, Oral, Daily **FOLLOWED BY** [START ON 4/20/2022] azithromycin (ZITHROMAX) tablet 250 mg, 250 mg, Oral, Daily  insulin glargine (LANTUS) injection vial 40 Units, 40 Units, SubCUTAneous, Daily  pantoprazole (PROTONIX) 40 mg in sodium chloride (PF) 10 mL injection, 40 mg, IntraVENous, Daily    Allergies:  Pcn [penicillins] and Zocor [simvastatin]    Social History:    Tobacco:  Pt reports no history of current use  Alcohol:  Pt reports no history of current use  Illicit Drugs: Pt reports no history or current use    Family History:   Unable to give detailed family history  Denies family history of colon or other GI cancers    REVIEW OF SYSTEMS:    Aside from what was mentioned in the PMH and HPI, essentially unremarkable, all others negative. PHYSICAL EXAM:      Vitals:    BP (!) 132/53   Pulse 74   Temp 97.8 °F (36.6 °C) (Oral)   Resp 18   Ht 5' 2\" (1.575 m)   Wt 183 lb 6 oz (83.2 kg)   SpO2 92%   BMI 33.54 kg/m²       CONSTITUTIONAL:  awake, alert, cooperative, no apparent distress, and appears stated age  EYES:  pupils equal, round and reactive to light, sclera anicteric and conjunctiva normal  ENT:  normocephalic, oral pharynx with moist mucous membranes  NECK:  supple   HEMATOLOGIC/LYMPHATICS:  no cervical lymphadenopathy and no supraclavicular lymphadenopathy  LUNGS:  No increased work of breathing, good air exchange, clear to auscultation bilaterally.   CARDIOVASCULAR:  Normal apical impulse, regular rate and rhythm, no murmur noted; 2+ pulses; no edema  ABDOMEN:  normal bowel sounds, soft, non-distended, non-tender, no masses palpated, no hepatosplenomegally  MUSCULOSKELETAL:  full range of motion noted  motor strength is 5 out of 5 all extremities bilaterally  NEUROLOGIC:  Mental Status Exam:  Level of Alertness:   awake  Orientation:   person, place, time  Motor Exam:  Motor exam is symmetrical 5 out of 5 all extremities bilaterally  SKIN:  normal skin color, texture, turgor    DATA:    CBC with Differential:    Lab Results   Component Value Date    WBC 12.4 04/18/2022    RBC 3.35 04/18/2022    HGB 9.1 04/19/2022    HCT 28.9 04/19/2022     04/18/2022    MCV 93.7 04/18/2022    MCH 29.6 04/18/2022    MCHC 31.5 04/18/2022    RDW 14.4 04/18/2022    SEGSPCT 76 12/08/2013    LYMPHOPCT 16.9 04/18/2022    MONOPCT 3.8 04/18/2022    BASOPCT 0.4 04/18/2022    MONOSABS 0.47 04/18/2022    LYMPHSABS 2.09 04/18/2022    EOSABS 0.06 04/18/2022    BASOSABS 0.05 04/18/2022     CMP:    Lab Results   Component Value Date     04/19/2022    K 4.5 04/19/2022     04/19/2022    CO2 26 04/19/2022    BUN 74 04/19/2022    CREATININE 1.0 04/19/2022    GFRAA >60 04/19/2022    LABGLOM 53 04/19/2022    GLUCOSE 149 04/19/2022    PROT 5.9 04/18/2022    LABALBU 3.7 04/18/2022    CALCIUM 9.6 04/19/2022    BILITOT 0.3 04/18/2022    ALKPHOS 68 04/18/2022    AST 19 04/18/2022    ALT 16 04/18/2022     Hepatic Function Panel:    Lab Results   Component Value Date    ALKPHOS 68 04/18/2022    ALT 16 04/18/2022    AST 19 04/18/2022    PROT 5.9 04/18/2022    BILITOT 0.3 04/18/2022    BILIDIR <0.2 04/18/2022    IBILI see below 04/18/2022    LABALBU 3.7 04/18/2022     PT/INR:    Lab Results   Component Value Date    PROTIME 10.8 05/26/2016    INR 1.0 05/26/2016     PTT:    Lab Results   Component Value Date    APTT 25.3 12/07/2013   [APTT}  Last 3 Troponin:    Lab Results   Component Value Date    TROPONINI <0.01 07/31/2015    TROPONINI <0.01 07/30/2015    TROPONINI <0.01 07/30/2015     TSH:    Lab Results   Component Value Date    TSH 3.200 07/30/2015          XR CHEST PORTABLE    Result Date: 4/18/2022  EXAMINATION: ONE XRAY VIEW OF THE CHEST 4/18/2022 8:36 pm COMPARISON: July 30, 2015 HISTORY: ORDERING SYSTEM PROVIDED HISTORY: chest burning TECHNOLOGIST PROVIDED HISTORY: Reason for exam:->chest burning FINDINGS: No airspace opacity or pleural effusion. The heart is normal size. No pneumothorax.  No free air beneath the hemidiaphragms. No pneumonia or pleural effusion. IMPRESSION:  · Anemia, suspicious for acute GI blood loss, occult stool positive  · CAD  · Diabetes    RECOMMENDATIONS:    · Patient had Lovenox this a.m. unable to proceed with EGD today  · EGD tomorrow with Dr. Marino Corona. Procedure details for EGD discussed in detail. Complications including but not limited to, perforation, bleeding and infection were discussed in great detail. Risks, benefits, and alternatives explained. Pt has understood the information and has agreed to proceed. See orders  · NPO after midnight  · Hold anticoagulants/antiplatets  · Monitor CBC daily  · INR tomorrow am   · Protonix 40 MG daily  · Ok for regular diet today   · Medical management per PCP to include IVF  · Supportive care  · Continue to monitor     Note: This report was completed utilizing computer voice recognition software. Every effort has been made to ensure accuracy, however; inadvertent computerized transcription errors may be present. Thank you very much for your consultation. We will follow closely with you.     Discussed with Dr. Lele Herman developed by Dr. Gene Delarosa, PETTY, NP-C 4/19/2022 12:41 PM for Dr. Marino Corona

## 2022-04-19 NOTE — ED NOTES
Present in room with Dr. Yohana Bermudez during rectal exam.  Patient tolerated without complaints. Hemoccult negative.        Chente Villa RN  04/18/22 [id number]

## 2022-04-19 NOTE — ANESTHESIA PRE PROCEDURE
Department of Anesthesiology  Preprocedure Note       Name:  Cain Armstrong   Age:  80 y.o.  :  1940                                          MRN:  [id number]         Date:  2022      Surgeon: Cassi Borden):  Hazel Ybarra MD    Procedure: Procedure(s):  EGD ESOPHAGOGASTRODUODENOSCOPY    Medications prior to admission:   Prior to Admission medications    Medication Sig Start Date End Date Taking? Authorizing Provider   ALPRAZolam Lillian Tyler) 0.5 MG tablet Take 1 mg by mouth nightly. 21   Historical Provider, MD   clopidogrel (PLAVIX) 75 MG tablet TAKE 1 TABLET BY MOUTH ONCE A DAY 20   Soha Travis DO   irbesartan (AVAPRO) 150 MG tablet Take 150 mg by mouth nightly    Historical Provider, MD   Insulin Degludec (TRESIBA FLEXTOUCH) 200 UNIT/ML SOPN Inject 60 Units into the skin daily     Historical Provider, MD   nitroGLYCERIN (NITROSTAT) 0.4 MG SL tablet Place 1 tablet under the tongue every 5 minutes as needed for Chest pain  Patient not taking: Reported on 2022   Soha Travis DO   insulin lispro (HUMALOG KWIKPEN) 100 UNIT/ML pen Inject into the skin 3 times daily (before meals)    Historical Provider, MD   INVOKANA 300 MG TABS tablet  16   Historical Provider, MD   atenolol (TENORMIN) 50 MG tablet Take 1 tablet by mouth daily 16   Soha Travis DO   atorvastatin (LIPITOR) 40 MG tablet Take 40 mg by mouth daily  11/9/15   Historical Provider, MD   metFORMIN (GLUCOPHAGE) 1000 MG tablet Take 1 tablet by mouth 2 times daily (with meals).  13   Jose Guadalupe Meadows MD   citalopram (CELEXA) 20 MG tablet Take 20 mg by mouth daily     Historical Provider, MD       Current medications:    Current Facility-Administered Medications   Medication Dose Route Frequency Provider Last Rate Last Admin    ALPRAZolam Lillian Gather) tablet 0.25 mg  0.25 mg Oral BID PRN Belen Mancilla MD        atenolol (TENORMIN) tablet 50 mg  50 mg Oral Daily Belen Mancilla MD   50 mg at 22 2328    atorvastatin (LIPITOR) tablet 40 mg  40 mg Oral Daily Wendall Koyanagi, MD   40 mg at 04/19/22 0810    citalopram (CELEXA) tablet 40 mg  40 mg Oral Daily Wendall Koyanagi, MD   40 mg at 04/19/22 0810    canagliflozin (INVOKANA) tablet 300 mg  300 mg Oral QAM AC Wendall Koyanagi, MD        losartan (COZAAR) tablet 50 mg  50 mg Oral Daily Wendall Koyanagi, MD   50 mg at 04/19/22 0810    metFORMIN (GLUCOPHAGE) tablet 1,000 mg  1,000 mg Oral BID WC Wendall Koyanagi, MD   1,000 mg at 04/19/22 0810    nitroGLYCERIN (NITROSTAT) SL tablet 0.4 mg  0.4 mg SubLINGual Q5 Min PRN Wendall Koyanagi, MD        sodium chloride flush 0.9 % injection 5-40 mL  5-40 mL IntraVENous 2 times per day Wendall Koyanagi, MD   10 mL at 04/19/22 0813    sodium chloride flush 0.9 % injection 5-40 mL  5-40 mL IntraVENous PRN Wendall Koyanagi, MD        0.9 % sodium chloride infusion   IntraVENous PRN Wendall Koyanagi, MD        enoxaparin (LOVENOX) injection 40 mg  40 mg SubCUTAneous Daily Wendall Koyanagi, MD   40 mg at 04/19/22 0811    ondansetron (ZOFRAN-ODT) disintegrating tablet 4 mg  4 mg Oral Q8H PRN Wendall Koyanagi, MD   4 mg at 04/19/22 0809    Or    ondansetron (ZOFRAN) injection 4 mg  4 mg IntraVENous Q6H PRN Wendall Koyanagi, MD        polyethylene glycol [facility]) packet 17 g  17 g Oral Daily PRN Wendall Koyanagi, MD        acetaminophen (TYLENOL) tablet 650 mg  650 mg Oral Q6H PRN Wendall Koyanagi, MD        Or    acetaminophen (TYLENOL) suppository 650 mg  650 mg Rectal Q6H PRN Wendall Koyanagi, MD        0.9 % sodium chloride infusion   IntraVENous Continuous Wendall Koyanagi, MD 75 mL/hr at 04/19/22 0149 New Bag at 04/19/22 0149    insulin lispro (HUMALOG) injection vial 0-18 Units  0-18 Units SubCUTAneous TID WC Wendall Koyanagi, MD        insulin lispro (HUMALOG) injection vial 0-9 Units  0-9 Units SubCUTAneous Nightly Wendall Koyanagi, MD   2 Units at 04/19/22 0150    glucose (GLUTOSE) 40 % oral gel 15 g  15 g Oral PRN Wendall Koyanagi, MD       [facility] dextrose 50 % IV solution  12.5 g IntraVENous PRN Wendall Koyanagi, MD        glucagon (rDNA) injection 1 mg  1 mg IntraMUSCular PRN Wendall Koyanagi, MD        dextrose 5 % solution  100 mL/hr IntraVENous PRN Wendall Koyanagi, MD        Stewart Stains ON 4/20/2022] azithromycin [facility]) tablet 250 mg  250 mg Oral Daily Wendall Koyanagi, MD        insulin glargine (LANTUS) injection vial 40 Units  40 Units SubCUTAneous Daily Wendall Koyanagi, MD        pantoprazole (PROTONIX) 40 mg in sodium chloride (PF) 10 mL injection  40 mg IntraVENous Daily Wendall Koyanagi, MD   40 mg at 04/19/22 0809       Allergies: Allergies   Allergen Reactions    Pcn [Penicillins] Hives    Zocor [Simvastatin] Hives       Problem List:    Patient Active Problem List   Diagnosis Code    CAD (coronary artery disease) I25.10    Hyperlipidemia E78.5    Hypertension I10    Diabetes mellitus ([address] E11.9    History of COVID-19 Z86.16    Anemia D64.9    Stool guaiac positive R19.5       Past Medical History:        Diagnosis Date    CAD (coronary artery disease)     Cataract (lens) fragments in eye following cataract surgery     Diabetes mellitus ([address]     Hyperlipidemia     Hypertension     Pancreatitis        Past Surgical History:        Procedure Laterality Date    APPENDECTOMY      CHOLECYSTECTOMY  12/2012    CORONARY ANGIOPLASTY WITH STENT PLACEMENT  7/31/2015    Alpine Xience stent 2.75x18 stent to Mid LAD and Diagonal artey    PTCA  5/27/2016    Dr. Meche Tillman  EF=60%  Alpine 3.0x 18 Prox LAD    TUBAL LIGATION         Social History:    Social History     Tobacco Use    Smoking status: Former Smoker     Types: Cigarettes    Smokeless tobacco: Never Used   Substance Use Topics    Alcohol use:  No                                Counseling given: Not Answered      Vital Signs (Current):   Vitals:    04/19/22 0034 04/19/22 0115 04/19/22 0314 04/19/22 0800   BP: (!) 118/56 (!) 120/50  (!) 132/53   Pulse: 76 76  74   Resp: 16 16  18   Temp: 36.4 °C (97.6 °F) 36.8 °C (98.2 °F)  36.6 °C (97.8 °F)   TempSrc: Oral Oral  Oral   SpO2: 93% 93%  92%   Weight:   183 lb 6 oz (83.2 kg)    Height:                                                  BP Readings from Last 3 Encounters:   22 (!) 132/53   21 (!) 140/66   21 118/62       NPO Status:  greater than 8 hours                                                                               BMI:   Wt Readings from Last 3 Encounters:   22 183 lb 6 oz (83.2 kg)   21 180 lb (81.6 kg)   20 182 lb (82.6 kg)     Body mass index is 33.54 kg/m². CBC:   Lab Results   Component Value Date    WBC 12.4 2022    RBC 3.35 2022    HGB 9.1 2022    HCT 28.9 2022    MCV 93.7 2022    RDW 14.4 2022     2022       CMP:   Lab Results   Component Value Date     2022    K 4.5 2022     2022    CO2 26 2022    BUN 74 2022    CREATININE 1.0 2022    GFRAA >60 2022    LABGLOM 53 2022    GLUCOSE 149 2022    PROT 5.9 2022    CALCIUM 9.6 2022    BILITOT 0.3 2022    ALKPHOS 68 2022    AST 19 2022    ALT 16 2022       POC Tests: No results for input(s): POCGLU, POCNA, POCK, POCCL, POCBUN, POCHEMO, POCHCT in the last 72 hours.     Coags:   Lab Results   Component Value Date    PROTIME 10.8 2016    INR 1.0 2016    APTT 25.3 2013       HCG (If Applicable): No results found for: PREGTESTUR, PREGSERUM, HCG, HCGQUANT     ABGs: No results found for: PHART, PO2ART, RKW9DBH, MEU4GGO, BEART, K3FNCDLG     Type & Screen (If Applicable):  No results found for: LABABO, LABRH    Drug/Infectious Status (If Applicable):  No results found for: HIV, HEPCAB    COVID-19 Screening (If Applicable): No results found for: COVID19    EK22  Normal sinus rhythm  Left axis deviation  Delayed precordial transition  Nonspecific ST and T wave abnormality  Abnormal ECG  No previous ECGs available  Confirmed by Vini Johnson ([id number]) on 4/19/2022 5:42:33 AM    ECHO: 7/30/15  Left Ventricle   Normal left ventricle size and systolic function   Indeterminate diastolic function   mild apical hypokinesis   Ejection fraction is visually estimated at 60%. Right Ventricle   Normal right ventricle structure and function. Left Atrium   Normal sized left atrium. Right Atrium   Normal right atrium size. Mitral Valve   Structurally normal mitral valve. Trace to mild mitral regurgitation      Tricuspid Valve   Not well visualized   Unable to estimate pulmonary systolic pressure. Aortic Valve   Not well visualized but grossly normal   No hemodynamically significant aortic stenosis is present. No evidence of aortic valve regurgitation      Pulmonic Valve   The pulmonic valve was not well visualized. Pericardial Effusion   No evidence of pericardial effusion. Aorta   Aortic root dimension within normal limits. Miscellaneous   Inferior Vena Cava not well visualized      Conclusions      Summary   Normal left ventricle size and systolic function   mild apical hypokinesis   Trace to mild mitral regurgitation    Chest x-ray: 4/18/22  No airspace opacity or pleural effusion. The heart is normal size. No   pneumothorax. No free air beneath the hemidiaphragms. Cardiac Cath: 5/31/2016   LEFT MAIN:  No angiographically significant stenosis. LAD:  Proximal 99% diffuse stenosis just proximal to and extending up into   the very proximal edge of the previous LAD stent. D1:  Proximal stent has an ostial to proximal 20% stenosis. CIRCUMFLEX:  Mid eccentric 50% stenosis. OM1:  Severely tortuous vessel. RIGHT CORONARY ARTERY:  Severely tortuous vessel. Dominant vessel. The PDA   has a mid 50% stenosis. This supplies collaterals to the mid and distal LAD. LEFT VENTRICULOGRAM:  Normal LV size and systolic function.   No wall examined, physical exam updated as needed, chart reviewed. NPO status confirmed. Anesthetic options and risks discussed with patient/legal guardian. Patient/legal guardian verbalized understanding and agrees to proceed.      PETTY Banuelos - CRNA  Staff CRNA  April 20, 2022  1:21 PM

## 2022-04-19 NOTE — ED PROVIDER NOTES
72-year-old female history of diabetes hyperlipidemia hypertension history of previous stent placement presents to the emergency department with mild dizziness and a queasy feeling in her stomach. Patient had a tooth extraction 5 days ago and was started on clindamycin for this she stated she stopped taking it 1 day ago due to it making her stomach feel bad she states since then she has had some mild queasy feeling as well as a mild dizziness. And has had some decreased p.o. intake she does state yesterday on Easter she ate a large meal and also stated she felt queasy after eating this meal she states novomiting diarrhea chest pain shortness of breath fevers cough headache urinary symptoms or other complaints at this time    The history is provided by the patient. Nausea & Vomiting  Severity:  Mild  Duration:  2 days  Timing:  Intermittent  Quality:  Stomach contents  Progression:  Unchanged  Chronicity:  New  Recent urination:  Normal  Relieved by:  Nothing  Worsened by:  Nothing  Ineffective treatments:  None tried  Associated symptoms: no abdominal pain, no arthralgias, no chills, no cough, no diarrhea, no fever, no headaches, no sore throat and no URI    Risk factors: diabetes         Review of Systems   Constitutional: Negative for chills and fever. HENT: Positive for dental problem. Negative for ear pain, sinus pressure and sore throat. Eyes: Negative for pain, discharge and redness. Respiratory: Negative for cough, shortness of breath and wheezing. Cardiovascular: Negative for chest pain. Gastrointestinal: Positive for nausea. Negative for abdominal distention, abdominal pain, diarrhea and vomiting. Genitourinary: Negative for dysuria and frequency. Musculoskeletal: Negative for arthralgias and back pain. Skin: Negative for rash and wound. Neurological: Positive for dizziness. Negative for weakness and headaches. Hematological: Negative for adenopathy.    All other systems reviewed and are negative. Physical Exam  Constitutional:       Appearance: Normal appearance. HENT:      Head: Normocephalic and atraumatic. Nose: Nose normal.      Mouth/Throat:      Mouth: Mucous membranes are moist.      Dentition: Abnormal dentition. Dental caries present. Eyes:      Extraocular Movements: Extraocular movements intact. Pupils: Pupils are equal, round, and reactive to light. Cardiovascular:      Rate and Rhythm: Normal rate and regular rhythm. Pulses: Normal pulses. Heart sounds: Normal heart sounds. Pulmonary:      Effort: Pulmonary effort is normal.      Breath sounds: Normal breath sounds. Abdominal:      General: Abdomen is flat. Bowel sounds are normal. There is no distension. Palpations: Abdomen is soft. Tenderness: There is no abdominal tenderness. There is no guarding. Genitourinary:     Rectum: Guaiac result negative. Musculoskeletal:         General: Normal range of motion. Cervical back: Normal range of motion and neck supple. Right lower leg: No edema. Left lower leg: No edema. Skin:     General: Skin is warm. Capillary Refill: Capillary refill takes less than 2 seconds. Neurological:      General: No focal deficit present. Mental Status: She is alert and oriented to person, place, and time. Procedures     MDM  Number of Diagnoses or Management Options  Anemia, unspecified type  Dizziness  Nausea  Diagnosis management comments: Patient was seen and examined. Labs and imaging were ordered. Initial work-up was reassuring however there was a significant drop in her hemoglobin from 13-9.9 this is over several years repeat hemoglobin was done and patient's hemoglobin had dropped to 9.   With symptoms is felt patient warrants further evaluation patient was placed under observation for anemia nausea and dizziness       ED Course as of 04/19/22 0305   Mon Apr 18, 2022   0121 Spoke with Dr. Clementine Lane who will place admission orderes [CF]      ED Course User Index  [CF] Doretha Mullins DO       --------------------------------------------- PAST HISTORY ---------------------------------------------  Past Medical History:  has a past medical history of CAD (coronary artery disease), Cataract (lens) fragments in eye following cataract surgery, Diabetes mellitus ([facility] [address] Hyperlipidemia, Hypertension, and Pancreatitis. Past Surgical History:  has a past surgical history that includes Tubal ligation; Appendectomy; Cholecystectomy (12/2012); Coronary angioplasty with stent (7/31/2015); and Percutaneous Transluminal Coronary Angio (5/27/2016). Social History:  reports that she has quit smoking. Her smoking use included cigarettes. She has never used smokeless tobacco. She reports that she does not drink alcohol and does not use drugs. Family History: family history includes Heart Disease in her father and mother. The patients home medications have been reviewed.     Allergies: Pcn [penicillins] and Zocor [simvastatin]    -------------------------------------------------- RESULTS -------------------------------------------------  Labs:  Results for orders placed or performed during the hospital encounter of 04/18/22   CBC with Auto Differential   Result Value Ref Range    WBC 12.4 (H) 4.5 - 11.5 E9/L    RBC 3.35 (L) 3.50 - 5.50 E12/L    Hemoglobin 9.9 (L) 11.5 - 15.5 g/dL    Hematocrit 31.4 (L) 34.0 - 48.0 %    MCV 93.7 80.0 - 99.9 fL    MCH 29.6 26.0 - 35.0 pg    MCHC 31.5 (L) 32.0 - 34.5 %    RDW 14.4 11.5 - 15.0 fL    Platelets 302 731 - 803 E9/L    MPV 10.5 7.0 - 12.0 fL    Neutrophils % 78.0 43.0 - 80.0 %    Immature Granulocytes % 0.4 0.0 - 5.0 %    Lymphocytes % 16.9 (L) 20.0 - 42.0 %    Monocytes % 3.8 2.0 - 12.0 %    Eosinophils % 0.5 0.0 - 6.0 %    Basophils % 0.4 0.0 - 2.0 %    Neutrophils Absolute 9.67 (H) 1.80 - 7.30 E9/L    Immature Granulocytes # 0.05 E9/L    Lymphocytes Absolute 2.09 1.50 - 4.00 E9/L Monocytes Absolute 0.47 0.10 - 0.95 E9/L    Eosinophils Absolute 0.06 0.05 - 0.50 E9/L    Basophils Absolute 0.05 0.00 - 0.20 O8/P   Basic Metabolic Panel   Result Value Ref Range    Sodium 138 132 - 146 mmol/L    Potassium 4.9 3.5 - 5.0 mmol/L    Chloride 102 98 - 107 mmol/L    CO2 24 22 - 29 mmol/L    Anion Gap 12 7 - 16 mmol/L    Glucose 201 (H) 74 - 99 mg/dL    BUN 59 (H) 6 - 23 mg/dL    CREATININE 1.0 0.5 - 1.0 mg/dL    GFR Non-African American 53 >=60 mL/min/1.73    GFR African American >60     Calcium 9.3 8.6 - 10.2 mg/dL   Hepatic Function Panel   Result Value Ref Range    Total Protein 5.9 (L) 6.4 - 8.3 g/dL    Albumin 3.7 3.5 - 5.2 g/dL    Alkaline Phosphatase 68 35 - 104 U/L    ALT 16 0 - 32 U/L    AST 19 0 - 31 U/L    Total Bilirubin 0.3 0.0 - 1.2 mg/dL    Bilirubin, Direct <0.2 0.0 - 0.3 mg/dL    Bilirubin, Indirect see below 0.0 - 1.0 mg/dL   Troponin   Result Value Ref Range    Troponin, High Sensitivity 13 (H) 0 - 9 ng/L   Lipase   Result Value Ref Range    Lipase 28 13 - 60 U/L   Hemoglobin and Hematocrit   Result Value Ref Range    Hemoglobin 9.0 (L) 11.5 - 15.5 g/dL    Hematocrit 29.3 (L) 34.0 - 48.0 %   POCT Glucose   Result Value Ref Range    Meter Glucose 164 (H) 74 - 99 mg/dL   EKG 12 Lead   Result Value Ref Range    Ventricular Rate 63 BPM    Atrial Rate 63 BPM    P-R Interval 150 ms    QRS Duration 80 ms    Q-T Interval 438 ms    QTc Calculation (Bazett) 448 ms    P Axis 29 degrees    R Axis -49 degrees    T Axis 61 degrees       Radiology:  XR CHEST PORTABLE   Final Result   No pneumonia or pleural effusion.             ------------------------- NURSING NOTES AND VITALS REVIEWED ---------------------------  Date / Time Roomed:  4/18/2022  8:07 PM  ED Bed Assignment:  [id number]-B    The nursing notes within the ED encounter and vital signs as below have been reviewed.    BP (!) 120/50   Pulse 76   Temp 98.2 °F (36.8 °C) (Oral)   Resp 16   Ht 5' 2\" (1.575 m)   Wt 174 lb (78.9 kg)   SpO2 93%   BMI 31.83 kg/m²   Oxygen Saturation Interpretation: Normal      ------------------------------------------ PROGRESS NOTES ------------------------------------------  I have spoken with the patient and discussed todays results, in addition to providing specific details for the plan of care and counseling regarding the diagnosis and prognosis. Their questions are answered at this time and they are agreeable with the plan. I discussed at length with them reasons for immediate return here for re evaluation. They will followup with their primary care physician by calling their office on Monday.      --------------------------------- ADDITIONAL PROVIDER NOTES ---------------------------------  At this time the patient is without objective evidence of an acute process requiring hospitalization or inpatient management. They have remained hemodynamically stable throughout their entire ED visit and are stable for discharge with outpatient follow-up. The plan has been discussed in detail and they are aware of the specific conditions for emergent return, as well as the importance of follow-up. Current Discharge Medication List          Diagnosis:  1. Nausea    2. Dizziness    3. Anemia, unspecified type        Disposition:  Patient's disposition: Discharge to home  Patient's condition is stable.        Roshan Castillo DO  04/19/22 [id number]

## 2022-04-19 NOTE — H&P
Prince Morgan MD History and Physical      CHIEF COMPLAINT:  Nausea, dizziness    History Obtained From:  Patient    HISTORY OF PRESENT ILLNESS:    The patient is a 80 y.o. female with significant past medical history of type 2 diabetes mellitus on insulin, HTN, hyperlipidemia, and history of previous stent placement, who presented to the emergency department with mild dizziness and a queasy feeling in her stomach last evening. Patient had a tooth extraction 6 days ago and was started on clindamycin for this she stated she stopped taking it 2 days ago due to it making her stomach feel bad. She states since then she has had some mild queasy feeling as well as a mild dizziness. And has had some decreased p.o. intake. she does state yesterday on Easter she ate a large meal and also stated she felt queasy after eating this meal she states novomiting diarrhea chest pain shortness of breath fevers cough headache urinary symptoms or other complaints at this time. She has had no vomiting, no diarrhea, no blood noted in stools, no melena. Past Medical History:     has a past medical history of CAD (coronary artery disease), Cataract (lens) fragments in eye following cataract surgery, Diabetes mellitus ([address] Hyperlipidemia, Hypertension, and Pancreatitis. Past Surgical History:     has a past surgical history that includes Tubal ligation; Appendectomy; Cholecystectomy (12/2012); Coronary angioplasty with stent (7/31/2015); and Percutaneous Transluminal Coronary Angio (5/27/2016). Medications Prior to Admission:    Medications Prior to Admission: ALPRAZolam (XANAX) 0.5 MG tablet, Take 1 mg by mouth nightly.    clopidogrel (PLAVIX) 75 MG tablet, TAKE 1 TABLET BY MOUTH ONCE A DAY  irbesartan (AVAPRO) 150 MG tablet, Take 150 mg by mouth nightly  Insulin Degludec (TRESIBA FLEXTOUCH) 200 UNIT/ML SOPN, Inject 60 Units into the skin daily   nitroGLYCERIN (NITROSTAT) 0.4 MG SL tablet, Place 1 tablet under the tongue every 5 minutes as needed for Chest pain (Patient not taking: Reported on 2022)  insulin lispro (HUMALOG KWIKPEN) 100 UNIT/ML pen, Inject into the skin 3 times daily (before meals)  INVOKANA 300 MG TABS tablet,   atenolol (TENORMIN) 50 MG tablet, Take 1 tablet by mouth daily  atorvastatin (LIPITOR) 40 MG tablet, Take 40 mg by mouth daily   metFORMIN (GLUCOPHAGE) 1000 MG tablet, Take 1 tablet by mouth 2 times daily (with meals). citalopram (CELEXA) 20 MG tablet, Take 20 mg by mouth daily     Allergies:  Pcn [penicillins] and Zocor [simvastatin]    Social History:    reports that she has quit smoking. Her smoking use included cigarettes. She has never used smokeless tobacco. She reports that she does not drink alcohol and does not use drugs.     Family History:       Problem Relation Age of Onset    Heart Disease Father          age 71    Heart Disease Mother         age 80        REVIEW OF SYSTEMS:  CONSTITUTIONAL:  negative for  fevers and chills  RESPIRATORY:  negative for  dry cough, cough with sputum, dyspnea, hemoptysis and chest pain  CARDIOVASCULAR:  negative for  chest pain, dyspnea, edema, syncope  GASTROINTESTINAL:  positive for nausea  GENITOURINARY:  negative for frequency, dysuria and hematuria  MUSCULOSKELETAL:  negative for  myalgias and arthralgias  NEUROLOGICAL:  negative for headaches    PHYSICAL EXAM:  Vitals:  BP (!) 120/50   Pulse 76   Temp 98.2 °F (36.8 °C) (Oral)   Resp 16   Ht 5' 2\" (1.575 m)   Wt 183 lb 6 oz (83.2 kg)   SpO2 93%   BMI 33.54 kg/m²   CONSTITUTIONAL:  awake, alert, cooperative, no apparent distress, and appears stated age  EYES:  Lids and lashes normal, pupils equal, round and reactive to light, extra ocular muscles intact, sclera clear, conjunctiva normal  ENT:  Normocephalic, without obvious abnormality, atraumatic, sinuses nontender on palpation, external ears without lesions, oral pharynx with moist mucus membranes, tonsils without erythema or exudates, gums normal and good dentition. NECK:  Supple, symmetrical, trachea midline, no adenopathy, thyroid symmetric, not enlarged and no tenderness, skin normal  HEMATOLOGIC/LYMPHATICS:  no cervical lymphadenopathy  BACK:  Symmetric, no curvature, spinous processes are non-tender on palpation, paraspinous muscles are non-tender on palpation, no costal vertebral tenderness  LUNGS:  No increased work of breathing, good air exchange, clear to auscultation bilaterally, no crackles or wheezing  CARDIOVASCULAR:  Normal apical impulse, regular rate and rhythm, normal S1 and S2, no S3 or S4, and no murmur noted  ABDOMEN:  Flat, soft, with mild epigastric and LUQ tenderness, no HSM, no masses, no guarding, no rebound tenderness  CHEST/BREASTS:  No chest tenderness  GENITAL/URINARY:  deferred  MUSCULOSKELETAL:  There is no redness, warmth, or swelling of the joints. Full range of motion noted. Motor strength is 5 out of 5 all extremities bilaterally.   Tone is normal.  NEUROLOGIC:  Awake, alert, cr ns 2-12 intact bilaterally, motor and sensory exams are normal  SKIN:  no bruising or bleeding and no rashes    DATA:  EKG:  NSR  CBC with Differential:    Lab Results   Component Value Date    WBC 12.4 04/18/2022    RBC 3.35 04/18/2022    HGB 9.1 04/19/2022    HCT 28.9 04/19/2022     04/18/2022    MCV 93.7 04/18/2022    MCH 29.6 04/18/2022    MCHC 31.5 04/18/2022    RDW 14.4 04/18/2022    SEGSPCT 76 12/08/2013    LYMPHOPCT 16.9 04/18/2022    MONOPCT 3.8 04/18/2022    BASOPCT 0.4 04/18/2022    MONOSABS 0.47 04/18/2022    LYMPHSABS 2.09 04/18/2022    EOSABS 0.06 04/18/2022    BASOSABS 0.05 04/18/2022     CMP:    Lab Results   Component Value Date     04/19/2022    K 4.5 04/19/2022     04/19/2022    CO2 26 04/19/2022    BUN 74 04/19/2022    CREATININE 1.0 04/19/2022    GFRAA >60 04/19/2022    LABGLOM 53 04/19/2022    GLUCOSE 149 04/19/2022    PROT 5.9 04/18/2022    LABALBU 3.7 04/18/2022    CALCIUM 9.6

## 2022-04-19 NOTE — CARE COORDINATION
4/19/2022  Social Work Discharge Planning: This worker met with   Pt to discuss  role and transition of care/discharge planning. Pt is from home alone and independent with no DME. Pt is a diabetic and has a glucometer and supplies. No current needs. Electronically signed by MIKO Bravo on 4/19/2022 at 10:43 AM

## 2022-04-20 ENCOUNTER — ANESTHESIA (OUTPATIENT)
Dept: ENDOSCOPY | Age: 82
DRG: 378 | End: 2022-04-20
Payer: MEDICARE

## 2022-04-20 VITALS
SYSTOLIC BLOOD PRESSURE: 110 MMHG | DIASTOLIC BLOOD PRESSURE: 52 MMHG | RESPIRATION RATE: 26 BRPM | OXYGEN SATURATION: 96 %

## 2022-04-20 LAB
ANION GAP SERPL CALCULATED.3IONS-SCNC: 9 MMOL/L (ref 7–16)
BASOPHILS ABSOLUTE: 0.02 E9/L (ref 0–0.2)
BASOPHILS RELATIVE PERCENT: 0.3 % (ref 0–2)
BUN BLDV-MCNC: 51 MG/DL (ref 6–23)
CALCIUM SERPL-MCNC: 8.9 MG/DL (ref 8.6–10.2)
CHLORIDE BLD-SCNC: 108 MMOL/L (ref 98–107)
CO2: 21 MMOL/L (ref 22–29)
CREAT SERPL-MCNC: 1.1 MG/DL (ref 0.5–1)
EOSINOPHILS ABSOLUTE: 0.06 E9/L (ref 0.05–0.5)
EOSINOPHILS RELATIVE PERCENT: 0.8 % (ref 0–6)
GFR AFRICAN AMERICAN: 58
GFR NON-AFRICAN AMERICAN: 48 ML/MIN/1.73
GLUCOSE BLD-MCNC: 93 MG/DL (ref 74–99)
HCT VFR BLD CALC: 23.3 % (ref 34–48)
HCT VFR BLD CALC: 23.8 % (ref 34–48)
HEMOGLOBIN: 7.3 G/DL (ref 11.5–15.5)
HEMOGLOBIN: 7.4 G/DL (ref 11.5–15.5)
IMMATURE GRANULOCYTES #: 0.03 E9/L
IMMATURE GRANULOCYTES %: 0.4 % (ref 0–5)
INR BLD: 1.1
LYMPHOCYTES ABSOLUTE: 2.54 E9/L (ref 1.5–4)
LYMPHOCYTES RELATIVE PERCENT: 33.8 % (ref 20–42)
MCH RBC QN AUTO: 29.8 PG (ref 26–35)
MCHC RBC AUTO-ENTMCNC: 31.8 % (ref 32–34.5)
MCV RBC AUTO: 94 FL (ref 80–99.9)
METER GLUCOSE: 103 MG/DL (ref 74–99)
METER GLUCOSE: 114 MG/DL (ref 74–99)
METER GLUCOSE: 148 MG/DL (ref 74–99)
MONOCYTES ABSOLUTE: 0.33 E9/L (ref 0.1–0.95)
MONOCYTES RELATIVE PERCENT: 4.4 % (ref 2–12)
NEUTROPHILS ABSOLUTE: 4.54 E9/L (ref 1.8–7.3)
NEUTROPHILS RELATIVE PERCENT: 60.3 % (ref 43–80)
PDW BLD-RTO: 14.9 FL (ref 11.5–15)
PLATELET # BLD: 139 E9/L (ref 130–450)
PMV BLD AUTO: 10 FL (ref 7–12)
POTASSIUM SERPL-SCNC: 4.2 MMOL/L (ref 3.5–5)
PROTHROMBIN TIME: 11.8 SEC (ref 9.3–12.4)
RBC # BLD: 2.48 E12/L (ref 3.5–5.5)
SODIUM BLD-SCNC: 138 MMOL/L (ref 132–146)
WBC # BLD: 7.5 E9/L (ref 4.5–11.5)

## 2022-04-20 PROCEDURE — 7100000010 HC PHASE II RECOVERY - FIRST 15 MIN: Performed by: INTERNAL MEDICINE

## 2022-04-20 PROCEDURE — 88305 TISSUE EXAM BY PATHOLOGIST: CPT

## 2022-04-20 PROCEDURE — 80048 BASIC METABOLIC PNL TOTAL CA: CPT

## 2022-04-20 PROCEDURE — 36415 COLL VENOUS BLD VENIPUNCTURE: CPT

## 2022-04-20 PROCEDURE — 6370000000 HC RX 637 (ALT 250 FOR IP): Performed by: INTERNAL MEDICINE

## 2022-04-20 PROCEDURE — 2709999900 HC NON-CHARGEABLE SUPPLY: Performed by: INTERNAL MEDICINE

## 2022-04-20 PROCEDURE — 3609012400 HC EGD TRANSORAL BIOPSY SINGLE/MULTIPLE: Performed by: INTERNAL MEDICINE

## 2022-04-20 PROCEDURE — 2060000000 HC ICU INTERMEDIATE R&B

## 2022-04-20 PROCEDURE — 2580000003 HC RX 258: Performed by: FAMILY MEDICINE

## 2022-04-20 PROCEDURE — 6370000000 HC RX 637 (ALT 250 FOR IP): Performed by: FAMILY MEDICINE

## 2022-04-20 PROCEDURE — 82962 GLUCOSE BLOOD TEST: CPT

## 2022-04-20 PROCEDURE — 85610 PROTHROMBIN TIME: CPT

## 2022-04-20 PROCEDURE — 85018 HEMOGLOBIN: CPT

## 2022-04-20 PROCEDURE — 3700000000 HC ANESTHESIA ATTENDED CARE: Performed by: INTERNAL MEDICINE

## 2022-04-20 PROCEDURE — 87081 CULTURE SCREEN ONLY: CPT

## 2022-04-20 PROCEDURE — 6360000002 HC RX W HCPCS: Performed by: FAMILY MEDICINE

## 2022-04-20 PROCEDURE — 2580000003 HC RX 258: Performed by: INTERNAL MEDICINE

## 2022-04-20 PROCEDURE — 7100000011 HC PHASE II RECOVERY - ADDTL 15 MIN: Performed by: INTERNAL MEDICINE

## 2022-04-20 PROCEDURE — C9113 INJ PANTOPRAZOLE SODIUM, VIA: HCPCS | Performed by: FAMILY MEDICINE

## 2022-04-20 PROCEDURE — 3700000001 HC ADD 15 MINUTES (ANESTHESIA): Performed by: INTERNAL MEDICINE

## 2022-04-20 PROCEDURE — 6360000002 HC RX W HCPCS: Performed by: INTERNAL MEDICINE

## 2022-04-20 PROCEDURE — 85014 HEMATOCRIT: CPT

## 2022-04-20 PROCEDURE — 0DB68ZX EXCISION OF STOMACH, VIA NATURAL OR ARTIFICIAL OPENING ENDOSCOPIC, DIAGNOSTIC: ICD-10-PCS | Performed by: INTERNAL MEDICINE

## 2022-04-20 PROCEDURE — 85025 COMPLETE CBC W/AUTO DIFF WBC: CPT

## 2022-04-20 PROCEDURE — 0DB98ZX EXCISION OF DUODENUM, VIA NATURAL OR ARTIFICIAL OPENING ENDOSCOPIC, DIAGNOSTIC: ICD-10-PCS | Performed by: INTERNAL MEDICINE

## 2022-04-20 RX ORDER — CYANOCOBALAMIN 1000 UG/ML
1000 INJECTION INTRAMUSCULAR; SUBCUTANEOUS ONCE
Status: COMPLETED | OUTPATIENT
Start: 2022-04-20 | End: 2022-04-20

## 2022-04-20 RX ORDER — ALPRAZOLAM 0.25 MG/1
0.5 TABLET ORAL 2 TIMES DAILY
Status: DISCONTINUED | OUTPATIENT
Start: 2022-04-20 | End: 2022-04-21 | Stop reason: HOSPADM

## 2022-04-20 RX ADMIN — ATENOLOL 50 MG: 50 TABLET ORAL at 07:59

## 2022-04-20 RX ADMIN — CITALOPRAM 40 MG: 20 TABLET, FILM COATED ORAL at 15:20

## 2022-04-20 RX ADMIN — ONDANSETRON 4 MG: 2 INJECTION INTRAMUSCULAR; INTRAVENOUS at 07:56

## 2022-04-20 RX ADMIN — SODIUM CHLORIDE, PRESERVATIVE FREE 40 MG: 5 INJECTION INTRAVENOUS at 08:02

## 2022-04-20 RX ADMIN — SODIUM CHLORIDE: 9 INJECTION, SOLUTION INTRAVENOUS at 15:47

## 2022-04-20 RX ADMIN — ACETAMINOPHEN 650 MG: 325 TABLET ORAL at 20:47

## 2022-04-20 RX ADMIN — Medication 10 ML: at 08:02

## 2022-04-20 RX ADMIN — ALPRAZOLAM 0.5 MG: 0.25 TABLET ORAL at 19:27

## 2022-04-20 RX ADMIN — ACETAMINOPHEN 650 MG: 325 TABLET ORAL at 07:57

## 2022-04-20 RX ADMIN — AZITHROMYCIN 250 MG: 250 TABLET, FILM COATED ORAL at 15:20

## 2022-04-20 RX ADMIN — CYANOCOBALAMIN 1000 MCG: 1000 INJECTION, SOLUTION INTRAMUSCULAR at 11:32

## 2022-04-20 RX ADMIN — SODIUM CHLORIDE: 9 INJECTION, SOLUTION INTRAVENOUS at 03:50

## 2022-04-20 ASSESSMENT — PAIN SCALES - GENERAL
PAINLEVEL_OUTOF10: 3
PAINLEVEL_OUTOF10: 2
PAINLEVEL_OUTOF10: 3
PAINLEVEL_OUTOF10: 0
PAINLEVEL_OUTOF10: 1

## 2022-04-20 ASSESSMENT — PAIN DESCRIPTION - ONSET: ONSET: ON-GOING

## 2022-04-20 ASSESSMENT — PAIN DESCRIPTION - FREQUENCY: FREQUENCY: CONTINUOUS

## 2022-04-20 ASSESSMENT — PAIN DESCRIPTION - LOCATION
LOCATION: TEETH
LOCATION: TEETH

## 2022-04-20 ASSESSMENT — PAIN - FUNCTIONAL ASSESSMENT
PAIN_FUNCTIONAL_ASSESSMENT: ACTIVITIES ARE NOT PREVENTED
PAIN_FUNCTIONAL_ASSESSMENT: 0-10

## 2022-04-20 ASSESSMENT — PAIN DESCRIPTION - DESCRIPTORS: DESCRIPTORS: ACHING;THROBBING

## 2022-04-20 ASSESSMENT — PAIN DESCRIPTION - PAIN TYPE: TYPE: ACUTE PAIN

## 2022-04-20 ASSESSMENT — PAIN DESCRIPTION - ORIENTATION: ORIENTATION: RIGHT;LOWER

## 2022-04-20 NOTE — PROGRESS NOTES
Patient for EGD today with Dr. Martinez Duty for anemia, occult positive stools, recent NSAID use. Hx CAD, DM and HLD. Hgb today 7.4 down from 9.9 at admission. Additional questions and concerns addressed.   Labs/chart reviewed  Consent signed  [id number] Teresa Manning to proceed    Consuelo Miller, APRN - CNP 4/20/2022 9:36 AM

## 2022-04-20 NOTE — BRIEF OP NOTE
Brief Postoperative Note    Migue Collado  YOB: 1940  [id number]    Procedure: EGD with biopsy    Anesthesia: [facility]    Surgeon:  Brady Ward MD    Findings:       Esophagus:  GERD      Stomach:4 SMALL GASTRIC ULCERS AND ONE MODERATE SIZED ULCER  BODY AND ANTRUM.   Gastritis bx done to rule out H. Pylori      Duodenum:  Normal    Bx. done to rule out sprue       Complications: None      Estimated blood loss: none      Renaye Aase, MD

## 2022-04-20 NOTE — CARE COORDINATION
4/20/2022  Social Work Discharge Planning:EGD today. Pt is from home alone and independent with no DME. Pt is a diabetic and has a glucometer and supplies. No current needs.  Electronically signed by MIKO Melton on 4/20/2022 at 9:10 AM

## 2022-04-20 NOTE — PROGRESS NOTES
Physician Progress Note      PATIENT:               Justus Jones  CSN #:                  [id number]  :                       1940  ADMIT DATE:       2022 8:07 PM  [address] [place] DATE:  RESPONDING  PROVIDER #:        Katarina Castillo MD          QUERY TEXT:    Dear Attending Physician,    Pt admitted with nausea ,dizziness and has Anemia documented. If possible,   please document in progress notes and discharge summary further specificity   regarding the acuity and type of Anemia:    The medical record reflects the following:  Risk Factors: 4 SMALL GASTRIC ULCERS AND ONE MODERATE SIZED ULCER  BODY AND   ANTRUM. Gastritis  Clinical Indicators: Per H/P,\". .Anemia 2022. [place] \"   Per PCP ,\". . Her   blood count seems to be dropping some with hydration but I am wondering if she   has a bleeding ulcer. [place] Upper gastrointestinal bleeding continuing. .\"  Per EGD  ,\". .Stomach:4 SMALL GASTRIC ULCERS AND ONE MODERATE SIZED ULCER  BODY AND   ANTRUM. Gastritis . [place] \"  Per GI ,\". .EGD today. [place] anemia, occult positive   stools, recent NSAID use. .Hgb today 7.4 down from 9.9 at admission . [place] \" Per   Labs- H/H  9.9/31.4 -> 9.0/29.3 ,  9.1/28.9-> 7.8/25.0,  7.4/23.3  Treatment: monitor H/H, IVF , Protonix    Thank you,  Kaiden Bell RN [facility]  Clinical Documentation Improvement Specialist  [phone]  Options provided:  -- Anemia due to acute blood loss  -- Anemia due to acute on chronic blood loss  -- Other - I will add my own diagnosis  -- Disagree - Not applicable / Not valid  -- Disagree - Clinically unable to determine / Unknown  -- Refer to Clinical Documentation Reviewer    PROVIDER RESPONSE TEXT:    This patient has acute blood loss anemia.     Query created by: Amanda Mcdowell on 2022 4:17 PM      Electronically signed by:  Katarina Castillo MD 2022 7:00 PM

## 2022-04-20 NOTE — PROGRESS NOTES
Department of Internal Toby Bartlett M.D. Progress Note      SUBJECTIVE: Recent dental work with clindamycin and a large amount of ibuprofen in the setting of Plavix.   Her blood count seems to be dropping some with hydration but I am wondering if she has a bleeding ulcer    OBJECTIVE abdomen is soft and nontender    Medications    Current Facility-Administered Medications: ALPRAZolam (XANAX) tablet 0.5 mg, 0.5 mg, Oral, BID  cyanocobalamin injection 1,000 mcg, 1,000 mcg, IntraMUSCular, Once  atenolol (TENORMIN) tablet 50 mg, 50 mg, Oral, Daily  atorvastatin (LIPITOR) tablet 40 mg, 40 mg, Oral, Daily  citalopram (CELEXA) tablet 40 mg, 40 mg, Oral, Daily  losartan (COZAAR) tablet 50 mg, 50 mg, Oral, Daily  [Held by provider] metFORMIN (GLUCOPHAGE) tablet 1,000 mg, 1,000 mg, Oral, BID WC  nitroGLYCERIN (NITROSTAT) SL tablet 0.4 mg, 0.4 mg, SubLINGual, Q5 Min PRN  sodium chloride flush 0.9 % injection 5-40 mL, 5-40 mL, IntraVENous, 2 times per day  sodium chloride flush 0.9 % injection 5-40 mL, 5-40 mL, IntraVENous, PRN  0.9 % sodium chloride infusion, , IntraVENous, PRN  ondansetron (ZOFRAN-ODT) disintegrating tablet 4 mg, 4 mg, Oral, Q8H PRN **OR** ondansetron (ZOFRAN) injection 4 mg, 4 mg, IntraVENous, Q6H PRN  polyethylene glycol (GLYCOLAX) packet 17 g, 17 g, Oral, Daily PRN  acetaminophen (TYLENOL) tablet 650 mg, 650 mg, Oral, Q6H PRN **OR** acetaminophen (TYLENOL) suppository 650 mg, 650 mg, Rectal, Q6H PRN  0.9 % sodium chloride infusion, , IntraVENous, Continuous  insulin lispro (HUMALOG) injection vial 0-18 Units, 0-18 Units, SubCUTAneous, TID   glucose (GLUTOSE) 40 % oral gel 15 g, 15 g, Oral, PRN  dextrose 50 % IV solution, 12.5 g, IntraVENous, PRN  glucagon (rDNA) injection 1 mg, 1 mg, IntraMUSCular, PRN  dextrose 5 % solution, 100 mL/hr, IntraVENous, PRN  [COMPLETED] azithromycin (ZITHROMAX) tablet 500 mg, 500 mg, Oral, Daily **FOLLOWED BY** azithromycin (ZITHROMAX) tablet 250 mg, 250 mg, Oral, Daily  insulin glargine (LANTUS) injection vial 40 Units, 40 Units, SubCUTAneous, Daily  pantoprazole (PROTONIX) 40 mg in sodium chloride (PF) 10 mL injection, 40 mg, IntraVENous, Daily  Physical    VITALS:  BP (!) 122/56   Pulse 62   Temp 97.7 °F (36.5 °C)   Resp 18   Ht 5' 2\" (1.575 m)   Wt 183 lb 2 oz (83.1 kg)   SpO2 97%   BMI 33.49 kg/m²   24HR INTAKE/OUTPUT:    Intake/Output Summary (Last 24 hours) at 4/20/2022 0815  Last data filed at 4/19/2022 1845  Gross per 24 hour   Intake 1238.82 ml   Output --   Net 1238.82 ml     LUNGS:  No increased work of breathing, good air exchange, clear to auscultation bilaterally, no crackles or wheezing  CARDIOVASCULAR:  Normal apical impulse, regular rate and rhythm, normal S1 and S2, no S3 or S4, and no murmur noted  Data    CBC with Differential:    Lab Results   Component Value Date    WBC 7.5 04/20/2022    RBC 2.48 04/20/2022    HGB 7.4 04/20/2022    HCT 23.3 04/20/2022     04/20/2022    MCV 94.0 04/20/2022    MCH 29.8 04/20/2022    MCHC 31.8 04/20/2022    RDW 14.9 04/20/2022    SEGSPCT 76 12/08/2013    LYMPHOPCT 33.8 04/20/2022    MONOPCT 4.4 04/20/2022    BASOPCT 0.3 04/20/2022    MONOSABS 0.33 04/20/2022    LYMPHSABS 2.54 04/20/2022    EOSABS 0.06 04/20/2022    BASOSABS 0.02 04/20/2022     CMP:    Lab Results   Component Value Date     04/20/2022    K 4.2 04/20/2022     04/20/2022    CO2 21 04/20/2022    BUN 51 04/20/2022    CREATININE 1.1 04/20/2022    GFRAA 58 04/20/2022    LABGLOM 48 04/20/2022    GLUCOSE 93 04/20/2022    PROT 5.9 04/18/2022    LABALBU 3.7 04/18/2022    CALCIUM 8.9 04/20/2022    BILITOT 0.3 04/18/2022    ALKPHOS 68 04/18/2022    AST 19 04/18/2022    ALT 16 04/18/2022     PT/INR:    Lab Results   Component Value Date    PROTIME 11.8 04/20/2022    INR 1.1 04/20/2022       ASSESSMENT AND PLAN      Principal Problem:  Upper gastrointestinal bleeding continuing, Protonix on board  Low vitamin B12 level will initiate vitamin B12    [facility]  Plan: Under control with sliding scale insulin  Anxiety neurosis we will give increase alprazolam        Electronically signed by Gwen Harding MD on 4/20/2022 at 8:15 AM

## 2022-04-21 VITALS
SYSTOLIC BLOOD PRESSURE: 134 MMHG | TEMPERATURE: 97.4 F | OXYGEN SATURATION: 93 % | WEIGHT: 181.44 LBS | DIASTOLIC BLOOD PRESSURE: 50 MMHG | RESPIRATION RATE: 18 BRPM | HEIGHT: 62 IN | BODY MASS INDEX: 33.39 KG/M2 | HEART RATE: 69 BPM

## 2022-04-21 LAB
CLOTEST: NORMAL
HCT VFR BLD CALC: 23.3 % (ref 34–48)
HEMOGLOBIN: 7.3 G/DL (ref 11.5–15.5)
METER GLUCOSE: 156 MG/DL (ref 74–99)
METER GLUCOSE: 174 MG/DL (ref 74–99)

## 2022-04-21 PROCEDURE — 6370000000 HC RX 637 (ALT 250 FOR IP): Performed by: NURSE PRACTITIONER

## 2022-04-21 PROCEDURE — A4216 STERILE WATER/SALINE, 10 ML: HCPCS | Performed by: INTERNAL MEDICINE

## 2022-04-21 PROCEDURE — 6370000000 HC RX 637 (ALT 250 FOR IP): Performed by: INTERNAL MEDICINE

## 2022-04-21 PROCEDURE — 2580000003 HC RX 258: Performed by: INTERNAL MEDICINE

## 2022-04-21 PROCEDURE — 6360000002 HC RX W HCPCS: Performed by: INTERNAL MEDICINE

## 2022-04-21 PROCEDURE — 82962 GLUCOSE BLOOD TEST: CPT

## 2022-04-21 PROCEDURE — 85014 HEMATOCRIT: CPT

## 2022-04-21 PROCEDURE — 85018 HEMOGLOBIN: CPT

## 2022-04-21 PROCEDURE — 36415 COLL VENOUS BLD VENIPUNCTURE: CPT

## 2022-04-21 PROCEDURE — C9113 INJ PANTOPRAZOLE SODIUM, VIA: HCPCS | Performed by: INTERNAL MEDICINE

## 2022-04-21 RX ORDER — AZITHROMYCIN 250 MG/1
250 TABLET, FILM COATED ORAL DAILY
Qty: 3 TABLET | Refills: 0 | Status: SHIPPED | OUTPATIENT
Start: 2022-04-21 | End: 2022-04-24

## 2022-04-21 RX ORDER — SUCRALFATE 1 G/1
1 TABLET ORAL
Status: DISCONTINUED | OUTPATIENT
Start: 2022-04-21 | End: 2022-04-21 | Stop reason: HOSPADM

## 2022-04-21 RX ORDER — PANTOPRAZOLE SODIUM 40 MG/1
40 TABLET, DELAYED RELEASE ORAL
Qty: 30 TABLET | Refills: 3 | Status: SHIPPED | OUTPATIENT
Start: 2022-04-21

## 2022-04-21 RX ORDER — PANTOPRAZOLE SODIUM 40 MG/1
40 TABLET, DELAYED RELEASE ORAL
Status: DISCONTINUED | OUTPATIENT
Start: 2022-04-22 | End: 2022-04-21 | Stop reason: HOSPADM

## 2022-04-21 RX ADMIN — SODIUM CHLORIDE 40 MG: 9 INJECTION, SOLUTION INTRAMUSCULAR; INTRAVENOUS; SUBCUTANEOUS at 08:27

## 2022-04-21 RX ADMIN — CITALOPRAM 40 MG: 20 TABLET, FILM COATED ORAL at 08:26

## 2022-04-21 RX ADMIN — ALPRAZOLAM 0.5 MG: 0.25 TABLET ORAL at 08:26

## 2022-04-21 RX ADMIN — INSULIN LISPRO 3 UNITS: 100 INJECTION, SOLUTION INTRAVENOUS; SUBCUTANEOUS at 11:33

## 2022-04-21 RX ADMIN — Medication 10 ML: at 08:27

## 2022-04-21 RX ADMIN — SUCRALFATE 1 G: 1 TABLET ORAL at 11:31

## 2022-04-21 RX ADMIN — LOSARTAN POTASSIUM 50 MG: 50 TABLET, FILM COATED ORAL at 08:26

## 2022-04-21 RX ADMIN — ATORVASTATIN CALCIUM 40 MG: 40 TABLET, FILM COATED ORAL at 08:26

## 2022-04-21 RX ADMIN — AZITHROMYCIN 250 MG: 250 TABLET, FILM COATED ORAL at 08:26

## 2022-04-21 RX ADMIN — ATENOLOL 50 MG: 50 TABLET ORAL at 08:26

## 2022-04-21 ASSESSMENT — PAIN SCALES - GENERAL
PAINLEVEL_OUTOF10: 0
PAINLEVEL_OUTOF10: 0

## 2022-04-21 NOTE — PROGRESS NOTES
PROGRESS NOTE        Patient Presents with/Seen in Consultation For      *Evaluation of guaiac positive stool  CHIEF COMPLAINT: Dizziness and queasy stomach  Subjective:     Patient seen Hilton Rear in bed in no apparent distress. Tolerating diet, no GI complaints. EGD findings discussed at length with patient, plan of care d/w pt all additional questions and concerns addressed. Review of Systems  Aside from what was mentioned in the PMH and HPI, essentially unremarkable, all others negative. Objective:     BP (!) 134/50   Pulse 69   Temp 97.4 °F (36.3 °C) (Oral)   Resp 18   Ht 5' 2\" (1.575 m)   Wt 181 lb 7 oz (82.3 kg)   SpO2 93%   BMI 33.19 kg/m²     General appearance: alert, awake, laying in bed, and cooperative  Eyes: conjunctiva plae, sclera anicteric. PERRL.   Lungs: clear to auscultation bilaterally  Heart: regular rate and rhythm, no murmur, 2+ pulses; no edema  Abdomen: soft, non-tender; bowel sounds normal; no masses,  no organomegaly  Extremities: extremities without edema  Pulses: 2+ and symmetric  Skin: Skin color, texture, turgor normal.   Neurologic: Grossly normal    [START ON 4/22/2022] pantoprazole (PROTONIX) tablet 40 mg, QAM AC  sucralfate (CARAFATE) tablet 1 g, TID AC  ALPRAZolam (XANAX) tablet 0.5 mg, BID  atenolol (TENORMIN) tablet 50 mg, Daily  atorvastatin (LIPITOR) tablet 40 mg, Daily  citalopram (CELEXA) tablet 40 mg, Daily  losartan (COZAAR) tablet 50 mg, Daily  [Held by provider] metFORMIN (GLUCOPHAGE) tablet 1,000 mg, BID WC  nitroGLYCERIN (NITROSTAT) SL tablet 0.4 mg, Q5 Min PRN  sodium chloride flush 0.9 % injection 5-40 mL, 2 times per day  sodium chloride flush 0.9 % injection 5-40 mL, PRN  0.9 % sodium chloride infusion, PRN  ondansetron (ZOFRAN-ODT) disintegrating tablet 4 mg, Q8H PRN   Or  ondansetron (ZOFRAN) injection 4 mg, Q6H PRN  polyethylene glycol (GLYCOLAX) packet 17 g, Daily PRN  acetaminophen (TYLENOL) tablet 650 mg, Q6H PRN   Or  acetaminophen (TYLENOL) suppository 650 mg, Q6H PRN  insulin lispro (HUMALOG) injection vial 0-18 Units, TID WC  glucose (GLUTOSE) 40 % oral gel 15 g, PRN  dextrose 50 % IV solution, PRN  glucagon (rDNA) injection 1 mg, PRN  dextrose 5 % solution, PRN  azithromycin (ZITHROMAX) tablet 250 mg, Daily  insulin glargine (LANTUS) injection vial 40 Units, Daily         Data Review  CBC:   Lab Results   Component Value Date    WBC 7.5 04/20/2022    RBC 2.48 04/20/2022    HGB 7.3 04/21/2022    HCT 23.3 04/21/2022    MCV 94.0 04/20/2022    MCH 29.8 04/20/2022    MCHC 31.8 04/20/2022    RDW 14.9 04/20/2022     04/20/2022    MPV 10.0 04/20/2022     CMP:    Lab Results   Component Value Date     04/20/2022    K 4.2 04/20/2022     04/20/2022    CO2 21 04/20/2022    BUN 51 04/20/2022    CREATININE 1.1 04/20/2022    GFRAA 58 04/20/2022    LABGLOM 48 04/20/2022    GLUCOSE 93 04/20/2022    PROT 5.9 04/18/2022    LABALBU 3.7 04/18/2022    CALCIUM 8.9 04/20/2022    BILITOT 0.3 04/18/2022    ALKPHOS 68 04/18/2022    AST 19 04/18/2022    ALT 16 04/18/2022     Hepatic Function Panel:    Lab Results   Component Value Date    ALKPHOS 68 04/18/2022    ALT 16 04/18/2022    AST 19 04/18/2022    PROT 5.9 04/18/2022    BILITOT 0.3 04/18/2022    BILIDIR <0.2 04/18/2022    IBILI see below 04/18/2022    LABALBU 3.7 04/18/2022       PT/INR:    Lab Results   Component Value Date    PROTIME 11.8 04/20/2022    INR 1.1 04/20/2022     IRON:    Lab Results   Component Value Date    IRON 60 04/19/2022         Assessment:     Active Problems:  ? Anemia, suspicious for acute GI blood loss, occult stool positive  ? NSAID use  ? CAD  ? Diabetes  ? EGD 4/20/22- Grade B LA classification GERD. The stomach showed four small gastric ulcers and one moderately large ulcer throughout the body and antrum of the stomach. There was gastritis, biopsied to rule out H. pylori infection. Duodenum biopsied to rule out sprue. Plan:   ? Protonix 40 MG daily  ?  Carafate as ordered  ? GERD/ULCER diet   ? NO NSAIDS  ? Medical management per PCP to include IVF  ? Supportive care   ? Discharge per PCP, ok from GI POV with outpatient follow up visit. Repeat EGD to be done in approx 6-9 months, to be scheduled at follow up visit. Note: This report was completed utilizing computer voice recognition software. Every effort has been made to ensure accuracy, however; inadvertent computerized transcription errors may be present.      Discussed with Dr. Guero Friend per Dr. Red DOVE-NP-C 4/21/2022  10:32 AM For Dr. Antonio Tian

## 2022-04-21 NOTE — OP NOTE
[id number] [address]                                OPERATIVE REPORT    PATIENT NAME: Jane Giron                  :        1940  MED REC NO:   [id number]                            ROOM:       0401  ACCOUNT NO:   [de-identified]                           ADMIT DATE: 2022  PROVIDER:     Hien Sanchez MD    DATE OF PROCEDURE:  2022    PROCEDURE PERFORMED:  Upper endoscopy with biopsy. PREOPERATIVE DIAGNOSES:  Abdominal pain, nausea, mild anemia. POSTOPERATIVE DIAGNOSES:  Grade B LA classification GERD. The stomach  showed four small gastric ulcers and one moderately large ulcer  throughout the body and antrum of the stomach. There was gastritis,  biopsied to rule out H. pylori infection. Duodenum biopsied to rule out  sprue. ANESTHESIA:  LMAC. NOTE:  Prior to the procedure an informed consent was obtained from the  patient after explaining the benefits as well as the risks,  alternatives, and complications of the procedure to the patient, who  understood and agreed. PROCEDURE:  With the patient in the left lateral decubitus position, the  Olympus GIF-100 forward-viewing videoscope was introduced into the  esophagus, the evaluation of which showed grade B LA classification GERD  and no hiatal hernia was seen. The scope was then advanced through the gastroesophageal junction into  the gastric body, along the greater curvature. Evaluation of the body  of the stomach showed five total ulcers, four small at the antrum and  the body of the stomach and the fifth was moderately large again at the  antrum of the stomach. Biopsies were done to rule out H. pylori  infection. The scope was then advanced through the pylorus into the duodenal bulb  and second portion of the duodenum, both of which appeared to be  unremarkable. Duodenum biopsied to rule out sprue.   The scope was then  retrieved and retroflexed in the prepyloric antrum, with thorough  evaluation of the cardiac and fundal portions of the stomach, which  appeared to be within normal limits. The scope was then straightened, the area deflated, and the procedure  was terminated by withdrawing the scope and conducting a second look on  the way out, which was essentially the same. The patient tolerated the procedure well.         Viola Christensen MD    D: 04/20/2022 13:48:26       T: 04/20/2022 14:02:05     [email or web address]  Job#: [id number]     Doc#: [id number]    CC:  MD Joe Ponce MD

## 2022-04-21 NOTE — CARE COORDINATION
4/21/2022  Social Work Discharge Planning:Discharge today. Plan is home with no needs.  Electronically signed by MIKO Sharp on 4/21/2022 at 9:31 AM

## 2022-04-21 NOTE — ANESTHESIA POSTPROCEDURE EVALUATION
Department of Anesthesiology  Postprocedure Note    Patient: Jennifer Hernandez  MRN: [id number]  YOB: 1940  Date of evaluation: 4/21/2022  Time:  7:00 AM     Procedure Summary     Date: 04/20/22 Room / Location: [address]    Anesthesia Start: 1306 Anesthesia Stop: 8473    Procedure: EGD BIOPSY (N/A ) Diagnosis: (/)    Surgeons: Mark Morgan MD Responsible Provider: Wilfred Heard DO    Anesthesia Type: MAC ASA Status: 3          Anesthesia Type: MAC    Cynthia Phase I: Cynthia Score: 10    Cynthia Phase II: Cynthia Score: 10    Last vitals: Reviewed and per EMR flowsheets.        Anesthesia Post Evaluation    Patient location during evaluation: PACU  Patient participation: complete - patient participated  Level of consciousness: awake and alert  Airway patency: patent  Nausea & Vomiting: no nausea and no vomiting  Complications: no  Cardiovascular status: hemodynamically stable  Respiratory status: acceptable  Hydration status: euvolemic

## 2022-04-21 NOTE — PROGRESS NOTES
IV removed, telemetry off, pt dressed, reviewed discharge instructions with pt, awaiting daughter to pick pt up.

## 2022-04-21 NOTE — PROGRESS NOTES
D/C'd per wc to awaiting vehicle, friend also with pt, all belongings taken in beloning bag, cell phone, , earrings placed in denture cup, AVS and scripts discussed.

## 2022-04-21 NOTE — DISCHARGE SUMMARY
Discharge Summary     Patient ID:  Tolu Aguirre  [id number]  61 y.o. 1940 female  Kei Cameron MD        Admit date: 4/18/2022    Discharge date and time:  4/21/2022  8:00 AM      Activity level: Up as tolerated  Disposition: To home  Condition on Discharge: Good    Admit Diagnoses: Upper gastrointestinal bleed    Discharge Diagnoses: Upper gastrointestinal bleed causing blood loss anemia.  5 gastric ulcers found    Consults:  IP CONSULT TO INTERNAL MEDICINE  IP CONSULT TO GI    Procedures: Endoscopy demonstrating multiple ulcers and biopsy done for H. pylori    Hospital Course: 68-year-old woman is been under some stress for several weeks and then started experiencing dental pain. She started on clindamycin and was taking a large amount of ibuprofen she describes about 1200 mg a day. She was also on Plavix. She presented with black tarry stool and feeling nauseous and lightheaded and determined to have an upper GI bleed. Endoscopy demonstrated 5 gastric ulcers and she is feeling much better as far as taking food she was cautioned about the low blood count causing lightheadedness dizziness and that she will follow-up in a week to check blood count      LABS:  Recent Labs     04/18/22 2035 04/19/22  0310 04/20/22  0225    139 138   K 4.9 4.5 4.2    103 108*   CO2 24 26 21*   BUN 59* 74* 51*   CREATININE 1.0 1.0 1.1*   GLUCOSE 201* 149* 93   CALCIUM 9.3 9.6 8.9       Recent Labs     04/18/22 2035 04/18/22  2235 04/20/22 0225 04/20/22  1609 04/21/22  0345   WBC 12.4*  --  7.5  --   --    RBC 3.35*  --  2.48*  --   --    HGB 9.9*   < > 7.4* 7.3* 7.3*   HCT 31.4*   < > 23.3* 23.8* 23.3*   MCV 93.7  --  94.0  --   --    MCH 29.6  --  29.8  --   --    MCHC 31.5*  --  31.8*  --   --    RDW 14.4  --  14.9  --   --      --  139  --   --    MPV 10.5  --  10.0  --   --     < > = values in this interval not displayed.        Recent Labs     04/21/22  0603   GLUMET 156*         Patient Instructions:   Current Discharge Medication List      START taking these medications    Details   azithromycin (ZITHROMAX) 250 MG tablet Take 1 tablet by mouth daily for 3 doses  Qty: 3 tablet, Refills: 0    Associated Diagnoses: Dental abscess      pantoprazole (PROTONIX) 40 MG tablet Take 1 tablet by mouth every morning (before breakfast)  Qty: 30 tablet, Refills: 3         CONTINUE these medications which have NOT CHANGED    Details   ALPRAZolam (XANAX) 0.5 MG tablet Take 1 mg by mouth nightly. irbesartan (AVAPRO) 150 MG tablet Take 150 mg by mouth nightly      Insulin Degludec (TRESIBA FLEXTOUCH) 200 UNIT/ML SOPN Inject 60 Units into the skin daily       nitroGLYCERIN (NITROSTAT) 0.4 MG SL tablet Place 1 tablet under the tongue every 5 minutes as needed for Chest pain  Qty: 25 tablet, Refills: 3      insulin lispro (HUMALOG KWIKPEN) 100 UNIT/ML pen Inject into the skin 3 times daily (before meals)      INVOKANA 300 MG TABS tablet       atenolol (TENORMIN) 50 MG tablet Take 1 tablet by mouth daily  Qty: 30 tablet, Refills: 3      atorvastatin (LIPITOR) 40 MG tablet Take 40 mg by mouth daily       metFORMIN (GLUCOPHAGE) 1000 MG tablet Take 1 tablet by mouth 2 times daily (with meals).   Qty: 60 tablet, Refills: 1    Comments: Stop if abd pain or diarrhea      citalopram (CELEXA) 20 MG tablet Take 20 mg by mouth daily          STOP taking these medications       clopidogrel (PLAVIX) 75 MG tablet Comments:   Reason for Stopping:                   Signed:  Electronically signed by Shiraz Vieira MD on 4/21/2022 at 8:00 AM

## 2022-05-31 ENCOUNTER — APPOINTMENT (OUTPATIENT)
Dept: CT IMAGING | Age: 82
End: 2022-05-31
Payer: MEDICARE

## 2022-05-31 ENCOUNTER — HOSPITAL ENCOUNTER (EMERGENCY)
Age: 82
Discharge: HOME | End: 2022-05-31
Attending: EMERGENCY MEDICINE
Payer: MEDICARE

## 2022-05-31 VITALS
HEART RATE: 68 BPM | RESPIRATION RATE: 18 BRPM | TEMPERATURE: 97.6 F | SYSTOLIC BLOOD PRESSURE: 133 MMHG | OXYGEN SATURATION: 95 % | DIASTOLIC BLOOD PRESSURE: 79 MMHG

## 2022-05-31 DIAGNOSIS — Z79.899 POLYPHARMACY: ICD-10-CM

## 2022-05-31 DIAGNOSIS — I70.90 ATHEROSCLEROSIS: ICD-10-CM

## 2022-05-31 DIAGNOSIS — R42 LIGHTHEADEDNESS: ICD-10-CM

## 2022-05-31 DIAGNOSIS — R53.1 GENERALIZED WEAKNESS: Primary | ICD-10-CM

## 2022-05-31 LAB
ALBUMIN SERPL-MCNC: 4.3 G/DL (ref 3.5–5.2)
ALP BLD-CCNC: 75 U/L (ref 35–104)
ALT SERPL-CCNC: 20 U/L (ref 0–32)
ANION GAP SERPL CALCULATED.3IONS-SCNC: 11 MMOL/L (ref 7–16)
APTT: 25.1 SEC (ref 24.5–35.1)
AST SERPL-CCNC: 23 U/L (ref 0–31)
BACTERIA: ABNORMAL /HPF
BASOPHILS ABSOLUTE: 0.02 E9/L (ref 0–0.2)
BASOPHILS RELATIVE PERCENT: 0.2 % (ref 0–2)
BILIRUB SERPL-MCNC: 0.2 MG/DL (ref 0–1.2)
BILIRUBIN DIRECT: <0.2 MG/DL (ref 0–0.3)
BILIRUBIN URINE: NEGATIVE
BILIRUBIN, INDIRECT: NORMAL MG/DL (ref 0–1)
BLOOD, URINE: NEGATIVE
BUN BLDV-MCNC: 23 MG/DL (ref 6–23)
CALCIUM SERPL-MCNC: 9.9 MG/DL (ref 8.6–10.2)
CHLORIDE BLD-SCNC: 100 MMOL/L (ref 98–107)
CLARITY: CLEAR
CO2: 24 MMOL/L (ref 22–29)
COLOR: YELLOW
CREAT SERPL-MCNC: 0.9 MG/DL (ref 0.5–1)
EOSINOPHILS ABSOLUTE: 0.01 E9/L (ref 0.05–0.5)
EOSINOPHILS RELATIVE PERCENT: 0.1 % (ref 0–6)
GFR AFRICAN AMERICAN: >60
GFR NON-AFRICAN AMERICAN: >60 ML/MIN/1.73
GLUCOSE BLD-MCNC: 110 MG/DL (ref 74–99)
GLUCOSE URINE: >=1000 MG/DL
HCT VFR BLD CALC: 37.9 % (ref 34–48)
HEMOGLOBIN: 11.3 G/DL (ref 11.5–15.5)
IMMATURE GRANULOCYTES #: 0.05 E9/L
IMMATURE GRANULOCYTES %: 0.5 % (ref 0–5)
INFLUENZA A BY PCR: NOT DETECTED
INFLUENZA B BY PCR: NOT DETECTED
INR BLD: 1
KETONES, URINE: NEGATIVE MG/DL
LACTIC ACID: 3.1 MMOL/L (ref 0.5–2.2)
LEUKOCYTE ESTERASE, URINE: NEGATIVE
LIPASE: 22 U/L (ref 13–60)
LYMPHOCYTES ABSOLUTE: 1.15 E9/L (ref 1.5–4)
LYMPHOCYTES RELATIVE PERCENT: 11.9 % (ref 20–42)
MAGNESIUM: 1.8 MG/DL (ref 1.6–2.6)
MCH RBC QN AUTO: 28 PG (ref 26–35)
MCHC RBC AUTO-ENTMCNC: 29.8 % (ref 32–34.5)
MCV RBC AUTO: 93.8 FL (ref 80–99.9)
MONOCYTES ABSOLUTE: 0.35 E9/L (ref 0.1–0.95)
MONOCYTES RELATIVE PERCENT: 3.6 % (ref 2–12)
NEUTROPHILS ABSOLUTE: 8.09 E9/L (ref 1.8–7.3)
NEUTROPHILS RELATIVE PERCENT: 83.7 % (ref 43–80)
NITRITE, URINE: POSITIVE
PDW BLD-RTO: 14.3 FL (ref 11.5–15)
PH UA: 5.5 (ref 5–9)
PLATELET # BLD: 234 E9/L (ref 130–450)
PMV BLD AUTO: 9.8 FL (ref 7–12)
POTASSIUM SERPL-SCNC: 4.8 MMOL/L (ref 3.5–5)
POTASSIUM SERPL-SCNC: 5.8 MMOL/L (ref 3.5–5)
PROTEIN UA: NEGATIVE MG/DL
PROTHROMBIN TIME: 11.1 SEC (ref 9.3–12.4)
RBC # BLD: 4.04 E12/L (ref 3.5–5.5)
RBC UA: ABNORMAL /HPF (ref 0–2)
SARS-COV-2, NAAT: NOT DETECTED
SODIUM BLD-SCNC: 135 MMOL/L (ref 132–146)
SPECIFIC GRAVITY UA: 1.01 (ref 1–1.03)
TOTAL PROTEIN: 6.7 G/DL (ref 6.4–8.3)
TROPONIN, HIGH SENSITIVITY: 17 NG/L (ref 0–9)
UROBILINOGEN, URINE: 0.2 E.U./DL
WBC # BLD: 9.7 E9/L (ref 4.5–11.5)
WBC UA: ABNORMAL /HPF (ref 0–5)

## 2022-05-31 PROCEDURE — 85610 PROTHROMBIN TIME: CPT

## 2022-05-31 PROCEDURE — 85730 THROMBOPLASTIN TIME PARTIAL: CPT

## 2022-05-31 PROCEDURE — 0042T CT BRAIN PERFUSION: CPT

## 2022-05-31 PROCEDURE — 80048 BASIC METABOLIC PNL TOTAL CA: CPT

## 2022-05-31 PROCEDURE — 70498 CT ANGIOGRAPHY NECK: CPT

## 2022-05-31 PROCEDURE — 70496 CT ANGIOGRAPHY HEAD: CPT

## 2022-05-31 PROCEDURE — 2580000003 HC RX 258: Performed by: EMERGENCY MEDICINE

## 2022-05-31 PROCEDURE — 80076 HEPATIC FUNCTION PANEL: CPT

## 2022-05-31 PROCEDURE — 87186 SC STD MICRODIL/AGAR DIL: CPT

## 2022-05-31 PROCEDURE — 84484 ASSAY OF TROPONIN QUANT: CPT

## 2022-05-31 PROCEDURE — 6360000004 HC RX CONTRAST MEDICATION: Performed by: RADIOLOGY

## 2022-05-31 PROCEDURE — 99285 EMERGENCY DEPT VISIT HI MDM: CPT

## 2022-05-31 PROCEDURE — 85025 COMPLETE CBC W/AUTO DIFF WBC: CPT

## 2022-05-31 PROCEDURE — 83690 ASSAY OF LIPASE: CPT

## 2022-05-31 PROCEDURE — 93005 ELECTROCARDIOGRAM TRACING: CPT | Performed by: EMERGENCY MEDICINE

## 2022-05-31 PROCEDURE — 84132 ASSAY OF SERUM POTASSIUM: CPT

## 2022-05-31 PROCEDURE — 83605 ASSAY OF LACTIC ACID: CPT

## 2022-05-31 PROCEDURE — 87635 SARS-COV-2 COVID-19 AMP PRB: CPT

## 2022-05-31 PROCEDURE — 87088 URINE BACTERIA CULTURE: CPT

## 2022-05-31 PROCEDURE — 83735 ASSAY OF MAGNESIUM: CPT

## 2022-05-31 PROCEDURE — 87502 INFLUENZA DNA AMP PROBE: CPT

## 2022-05-31 PROCEDURE — 81001 URINALYSIS AUTO W/SCOPE: CPT

## 2022-05-31 RX ORDER — SODIUM CHLORIDE 9 MG/ML
INJECTION, SOLUTION INTRAVENOUS ONCE
Status: COMPLETED | OUTPATIENT
Start: 2022-05-31 | End: 2022-05-31

## 2022-05-31 RX ADMIN — SODIUM CHLORIDE: 9 INJECTION, SOLUTION INTRAVENOUS at 16:40

## 2022-05-31 RX ADMIN — IOPAMIDOL 75 ML: 755 INJECTION, SOLUTION INTRAVENOUS at 15:49

## 2022-05-31 NOTE — ED NOTES
Name: Ellie Preciado  : 1940  MRN: [id number]    Date: 2022    Benefits of immediately proceeding with Radiology exam outweigh the risks and therefore the following is being waived:      [] Pregnancy test    [] Protocol for Iodine allergy    [] MRI questionnaire    [x] BUN/Creatinine        DO Kamila Cotto DO  22 1439

## 2022-05-31 NOTE — ED PROVIDER NOTES
Luis Alfredo Cha is a 80 y.o. female presenting to the ED for lightheadedness, dizzyness, beginning this am ago. The complaint has been intermittent, moderate in severity, and worsened by nothing. 79 yo f who has history of anemia and gastric ulcers 6 weeks ago was on plavix before and was suppose to stop it on last visit but patients states no one told her and she did not stop it. Pt is on protonix which she is taking. Pt notes she was having a hard time sleeping l;ast night took 0.5mg xanax x2 , 1x 10mg of ambien at bedtime and does have a new medicine she bought online but hasnt taken it recently she stae for sleep. Pt noites she woike up \"feeling like she was drunk\". Pt notes felt dizzy lightheaded and generally week iun the legs. She notes was having new appliances delivered today and was weak and had difficulty helping delivery patients in. Pt denies any headache neck pain, vision changes, speech changes, focal weakness or numbness. Pt notes generally week. Review of Systems:   Pertinent positives and negatives are stated within HPI, all other systems reviewed and are negative.          --------------------------------------------- PAST HISTORY ---------------------------------------------  Past Medical History:  has a past medical history of CAD (coronary artery disease), Cataract (lens) fragments in eye following cataract surgery, Diabetes mellitus ([facility] [address] Hyperlipidemia, Hypertension, and Pancreatitis. Past Surgical History:  has a past surgical history that includes Tubal ligation; Appendectomy; Cholecystectomy (12/2012); Coronary angioplasty with stent (7/31/2015); Percutaneous Transluminal Coronary Angio (5/27/2016); and Upper gastrointestinal endoscopy (N/A, 4/20/2022). Social History:  reports that she has quit smoking. Her smoking use included cigarettes. She has never used smokeless tobacco. She reports that she does not drink alcohol and does not use drugs.     Family mg/dL    CREATININE 0.9 0.5 - 1.0 mg/dL    GFR Non-African American >60 >=60 mL/min/1.73    GFR African American >60     Calcium 9.9 8.6 - 10.2 mg/dL   Magnesium   Result Value Ref Range    Magnesium 1.8 1.6 - 2.6 mg/dL   Hepatic Function Panel   Result Value Ref Range    Total Protein 6.7 6.4 - 8.3 g/dL    Albumin 4.3 3.5 - 5.2 g/dL    Alkaline Phosphatase 75 35 - 104 U/L    ALT 20 0 - 32 U/L    AST 23 0 - 31 U/L    Total Bilirubin 0.2 0.0 - 1.2 mg/dL    Bilirubin, Direct <0.2 0.0 - 0.3 mg/dL    Bilirubin, Indirect see below 0.0 - 1.0 mg/dL   Lipase   Result Value Ref Range    Lipase 22 13 - 60 U/L   Troponin   Result Value Ref Range    Troponin, High Sensitivity 17 (H) 0 - 9 ng/L   Protime-INR   Result Value Ref Range    Protime 11.1 9.3 - 12.4 sec    INR 1.0    APTT   Result Value Ref Range    aPTT 25.1 24.5 - 35.1 sec   Urinalysis with Microscopic   Result Value Ref Range    Color, UA Yellow Straw/Yellow    Clarity, UA Clear Clear    Glucose, Ur >=1000 (A) Negative mg/dL    Bilirubin Urine Negative Negative    Ketones, Urine Negative Negative mg/dL    Specific Gravity, UA 1.010 1.005 - 1.030    Blood, Urine Negative Negative    pH, UA 5.5 5.0 - 9.0    Protein, UA Negative Negative mg/dL    Urobilinogen, Urine 0.2 <2.0 E.U./dL    Nitrite, Urine POSITIVE (A) Negative    Leukocyte Esterase, Urine Negative Negative    WBC, UA NONE 0 - 5 /HPF    RBC, UA NONE 0 - 2 /HPF    Bacteria, UA FEW (A) None Seen /HPF   Potassium   Result Value Ref Range    Potassium 4.8 3.5 - 5.0 mmol/L   EKG 12 Lead   Result Value Ref Range    Ventricular Rate 71 BPM    Atrial Rate 71 BPM    P-R Interval 166 ms    QRS Duration 84 ms    Q-T Interval 416 ms    QTc Calculation (Bazett) 452 ms    P Axis 46 degrees    R Axis -69 degrees    T Axis 73 degrees       RADIOLOGY:  Interpreted by Radiologist.  CTA HEAD W CONTRAST   Final Result   Note that delay in report was due to missing images.       CT Head:      No acute intracranial hemorrhage or mass effect. No CT evidence of large acute infarct. Chronic microvascular ischemic changes. CT Perfusion:      No true perfusion mismatch. No \"infarct core\". The software reports 2 mL of \"ischemic brain\" in the evangelista   and anterior left cerebellum, however this is artifact due to motion. CTA Neck:      Less than 50% stenosis of the bilateral cervical ICAs, secondary to calcified   plaque. No high-grade stenosis or dissection of the cervical vertebral arteries. CTA Head:      No evidence of proximal large vessel arterial occlusion or high-grade   stenosis. CTA NECK W CONTRAST   Final Result   Note that delay in report was due to missing images. CT Head:      No acute intracranial hemorrhage or mass effect. No CT evidence of large acute infarct. Chronic microvascular ischemic changes. CT Perfusion:      No true perfusion mismatch. No \"infarct core\". The software reports 2 mL of \"ischemic brain\" in the evangelista   and anterior left cerebellum, however this is artifact due to motion. CTA Neck:      Less than 50% stenosis of the bilateral cervical ICAs, secondary to calcified   plaque. No high-grade stenosis or dissection of the cervical vertebral arteries. CTA Head:      No evidence of proximal large vessel arterial occlusion or high-grade   stenosis. CT BRAIN PERFUSION   Final Result   Note that delay in report was due to missing images. CT Head:      No acute intracranial hemorrhage or mass effect. No CT evidence of large acute infarct. Chronic microvascular ischemic changes. CT Perfusion:      No true perfusion mismatch. No \"infarct core\". The software reports 2 mL of \"ischemic brain\" in the evangelista   and anterior left cerebellum, however this is artifact due to motion. CTA Neck:      Less than 50% stenosis of the bilateral cervical ICAs, secondary to calcified   plaque.       No high-grade stenosis or dissection of the cervical vertebral arteries. CTA Head:      No evidence of proximal large vessel arterial occlusion or high-grade   stenosis.             ------------------------- NURSING NOTES AND VITALS REVIEWED ---------------------------   The nursing notes within the ED encounter and vital signs as below have been reviewed. /79   Pulse 68   Temp 97.6 °F (36.4 °C)   Resp 18   SpO2 95%   Oxygen Saturation Interpretation: Normal      ---------------------------------------------------PHYSICAL EXAM--------------------------------------    Physical Exam  Vitals reviewed. Constitutional:       General: She is not in acute distress. Appearance: Normal appearance. She is not toxic-appearing. HENT:      Head: Normocephalic and atraumatic. Right Ear: External ear normal.      Left Ear: External ear normal.      Nose: Nose normal. No congestion. Mouth/Throat:      Mouth: Mucous membranes are moist.      Pharynx: Oropharynx is clear. No posterior oropharyngeal erythema. Eyes:      General: No visual field deficit. Extraocular Movements: Extraocular movements intact. Pupils: Pupils are equal, round, and reactive to light. Cardiovascular:      Rate and Rhythm: Normal rate and regular rhythm. Pulses: Normal pulses. Heart sounds: No murmur heard. Pulmonary:      Effort: Pulmonary effort is normal.      Breath sounds: No wheezing or rhonchi. Chest:      Chest wall: No tenderness. Abdominal:      General: Bowel sounds are normal.      Tenderness: There is no abdominal tenderness. There is no right CVA tenderness, left CVA tenderness or guarding. Musculoskeletal:         General: No swelling or deformity. Cervical back: Normal range of motion and neck supple. No muscular tenderness. Skin:     General: Skin is warm and dry. Capillary Refill: Capillary refill takes less than 2 seconds. Neurological:      General: No focal deficit present. Mental Status: She is alert and oriented to person, place, and time. GCS: GCS eye subscore is 4. GCS verbal subscore is 5. GCS motor subscore is 6. Cranial Nerves: Cranial nerves are intact. No cranial nerve deficit or dysarthria. Sensory: Sensation is intact. No sensory deficit. Motor: Motor function is intact. No weakness. Coordination: Coordination is intact. Romberg sign negative. Coordination normal. Finger-Nose-Finger Test and Heel to [facility] Test normal.      Gait: Gait is intact. Psychiatric:         Mood and Affect: Mood normal.       NIHSS=zero      ------------------------------ ED COURSE/MEDICAL DECISION MAKING----------------------  Medications   iopamidol (ISOVUE-370) 76 % injection 75 mL (75 mLs IntraVENous Given 5/31/22 1549)   0.9 % sodium chloride infusion (0 mL/hr IntraVENous Stopped 5/31/22 2102)     EKG: This EKG is signed and interpreted by me. Time:1720  Rate: 71  Rhythm: Sinus  Interpretation: non-specific EKG  Comparison: stable as compared to patient's most recent EKG      ED COURSE:  ED Course as of 05/31/22 2112   Tue May 31, 2022   1546 NIHSS = zero [FAROOQ]   2000 Pt ambulating w/o difficulty [FAROOQ]      ED Course User Index  [FAROOQ] Delvin Cordero,        Medical Decision Making:    Likely with medication side effects. I recommended she only take her medication as prescribed she should not be taking more than 1 Xanax at a time and she should not be taking it with other sleeping medicines. She also was told on previous discharge papers that she was supposed to stop taking Plavix. Her hemoglobin is going up her labs actually look very good she is to discuss with her family doctor her medications she is to call tomorrow and schedule appointment to be seen in the next 2 to 3 days. We did discuss warning signs and symptoms for when to return patient verbalized understanding of instructions.     Risks and benefits were discussed with patient for All medications dispensed and given in department as well prescriptions prescribed for home, . The patient elected to take the medicine. Pt instructed on warning signs and precautions for medication side effects. The patient was given warning signs for when to seek medical attention. Counseled regarding todays diagnosis, including possible risks and complications,  especially if left uncontrolled. I did discuss warning signs for when to return to the Emergency Room, and the patient verbalized understanding      Counseling: The emergency provider has spoken with the patient and discussed todays results, in addition to providing specific details for the plan of care and counseling regarding the diagnosis and prognosis. Questions are answered at this time and they are agreeable with the plan.      --------------------------------- IMPRESSION AND DISPOSITION ---------------------------------    IMPRESSION  1. Generalized weakness    2. Lightheadedness    3. Atherosclerosis    4. Polypharmacy        DISPOSITION  Disposition: Discharge to home  Patient condition is fair      NOTE: This report was transcribed using voice recognition software.  Every effort was made to ensure accuracy; however, inadvertent computerized transcription errors may be present       Harriett De La Vega DO  05/31/22 8228

## 2022-06-01 LAB
EKG ATRIAL RATE: 71 BPM
EKG P AXIS: 46 DEGREES
EKG P-R INTERVAL: 166 MS
EKG Q-T INTERVAL: 416 MS
EKG QRS DURATION: 84 MS
EKG QTC CALCULATION (BAZETT): 452 MS
EKG R AXIS: -69 DEGREES
EKG T AXIS: 73 DEGREES
EKG VENTRICULAR RATE: 71 BPM

## 2022-06-03 LAB
ORGANISM: ABNORMAL
URINE CULTURE, ROUTINE: ABNORMAL

## 2022-07-11 ENCOUNTER — HOSPITAL ENCOUNTER (OUTPATIENT)
Age: 82
Discharge: HOME | End: 2022-07-13

## 2022-07-11 PROCEDURE — 87081 CULTURE SCREEN ONLY: CPT

## 2022-07-11 PROCEDURE — 88305 TISSUE EXAM BY PATHOLOGIST: CPT

## 2022-07-13 LAB — CLOTEST: NORMAL

## 2022-07-18 ENCOUNTER — HOSPITAL ENCOUNTER (EMERGENCY)
Age: 82
Discharge: HOME | End: 2022-07-18

## 2022-07-18 VITALS
RESPIRATION RATE: 16 BRPM | DIASTOLIC BLOOD PRESSURE: 65 MMHG | BODY MASS INDEX: 30.21 KG/M2 | HEART RATE: 50 BPM | HEIGHT: 61 IN | OXYGEN SATURATION: 98 % | WEIGHT: 160 LBS | TEMPERATURE: 97.2 F | SYSTOLIC BLOOD PRESSURE: 109 MMHG
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
## 2022-08-10 ENCOUNTER — APPOINTMENT (OUTPATIENT)
Dept: CT IMAGING | Age: 82
DRG: 885 | End: 2022-08-10
Payer: MEDICARE

## 2022-08-10 ENCOUNTER — HOSPITAL ENCOUNTER (INPATIENT)
Age: 82
LOS: 6 days | Discharge: HOME | DRG: 885 | End: 2022-08-16
Attending: EMERGENCY MEDICINE | Admitting: PSYCHIATRY & NEUROLOGY
Payer: MEDICARE

## 2022-08-10 DIAGNOSIS — Z76.89 ENCOUNTER FOR PSYCHIATRIC ASSESSMENT: Primary | ICD-10-CM

## 2022-08-10 PROBLEM — F23 ACUTE PSYCHOSIS (HCC): Status: ACTIVE | Noted: 2022-08-10

## 2022-08-10 LAB
ACETAMINOPHEN LEVEL: <5 MCG/ML (ref 10–30)
ALBUMIN SERPL-MCNC: 3.6 G/DL (ref 3.5–5.2)
ALP BLD-CCNC: 91 U/L (ref 35–104)
ALT SERPL-CCNC: 22 U/L (ref 0–32)
AMPHETAMINE SCREEN, URINE: NOT DETECTED
ANION GAP SERPL CALCULATED.3IONS-SCNC: 15 MMOL/L (ref 7–16)
AST SERPL-CCNC: 50 U/L (ref 0–31)
BACTERIA: ABNORMAL /HPF
BARBITURATE SCREEN URINE: NOT DETECTED
BASOPHILS ABSOLUTE: 0.04 E9/L (ref 0–0.2)
BASOPHILS RELATIVE PERCENT: 0.4 % (ref 0–2)
BENZODIAZEPINE SCREEN, URINE: POSITIVE
BILIRUB SERPL-MCNC: 0.7 MG/DL (ref 0–1.2)
BILIRUBIN URINE: NEGATIVE
BLOOD, URINE: ABNORMAL
BUN BLDV-MCNC: 9 MG/DL (ref 6–23)
CALCIUM SERPL-MCNC: 9.6 MG/DL (ref 8.6–10.2)
CANNABINOID SCREEN URINE: POSITIVE
CHLORIDE BLD-SCNC: 100 MMOL/L (ref 98–107)
CHP ED QC CHECK: YES
CLARITY: CLEAR
CO2: 27 MMOL/L (ref 22–29)
COCAINE METABOLITE SCREEN URINE: NOT DETECTED
COLOR: YELLOW
CREAT SERPL-MCNC: 0.8 MG/DL (ref 0.5–1)
EKG ATRIAL RATE: 79 BPM
EKG P AXIS: 41 DEGREES
EKG P-R INTERVAL: 144 MS
EKG Q-T INTERVAL: 408 MS
EKG QRS DURATION: 80 MS
EKG QTC CALCULATION (BAZETT): 467 MS
EKG R AXIS: -60 DEGREES
EKG T AXIS: 41 DEGREES
EKG VENTRICULAR RATE: 79 BPM
EOSINOPHILS ABSOLUTE: 0 E9/L (ref 0.05–0.5)
EOSINOPHILS RELATIVE PERCENT: 0 % (ref 0–6)
EPITHELIAL CELLS, UA: ABNORMAL /HPF
ETHANOL: <10 MG/DL (ref 0–0.08)
FENTANYL SCREEN, URINE: NOT DETECTED
GFR AFRICAN AMERICAN: >60
GFR NON-AFRICAN AMERICAN: >60 ML/MIN/1.73
GLUCOSE BLD-MCNC: 111 MG/DL (ref 74–99)
GLUCOSE BLD-MCNC: 44 MG/DL
GLUCOSE URINE: >=1000 MG/DL
HCT VFR BLD CALC: 42.3 % (ref 34–48)
HEMOGLOBIN: 13 G/DL (ref 11.5–15.5)
IMMATURE GRANULOCYTES #: 0.06 E9/L
IMMATURE GRANULOCYTES %: 0.6 % (ref 0–5)
KETONES, URINE: 15 MG/DL
LEUKOCYTE ESTERASE, URINE: ABNORMAL
LYMPHOCYTES ABSOLUTE: 1.25 E9/L (ref 1.5–4)
LYMPHOCYTES RELATIVE PERCENT: 12.7 % (ref 20–42)
Lab: ABNORMAL
MCH RBC QN AUTO: 26.1 PG (ref 26–35)
MCHC RBC AUTO-ENTMCNC: 30.7 % (ref 32–34.5)
MCV RBC AUTO: 84.9 FL (ref 80–99.9)
METER GLUCOSE: 113 MG/DL (ref 74–99)
METER GLUCOSE: 171 MG/DL (ref 74–99)
METER GLUCOSE: 205 MG/DL (ref 74–99)
METER GLUCOSE: 44 MG/DL (ref 74–99)
METHADONE SCREEN, URINE: NOT DETECTED
MONOCYTES ABSOLUTE: 0.7 E9/L (ref 0.1–0.95)
MONOCYTES RELATIVE PERCENT: 7.1 % (ref 2–12)
NEUTROPHILS ABSOLUTE: 7.83 E9/L (ref 1.8–7.3)
NEUTROPHILS RELATIVE PERCENT: 79.2 % (ref 43–80)
NITRITE, URINE: NEGATIVE
OPIATE SCREEN URINE: NOT DETECTED
OXYCODONE URINE: NOT DETECTED
PDW BLD-RTO: 17.2 FL (ref 11.5–15)
PH UA: 6 (ref 5–9)
PHENCYCLIDINE SCREEN URINE: NOT DETECTED
PLATELET # BLD: 287 E9/L (ref 130–450)
PMV BLD AUTO: 10.1 FL (ref 7–12)
POTASSIUM SERPL-SCNC: 3.5 MMOL/L (ref 3.5–5)
PROTEIN UA: ABNORMAL MG/DL
RBC # BLD: 4.98 E12/L (ref 3.5–5.5)
RBC UA: ABNORMAL /HPF (ref 0–2)
SALICYLATE, SERUM: <0.3 MG/DL (ref 0–30)
SARS-COV-2, NAAT: NOT DETECTED
SODIUM BLD-SCNC: 142 MMOL/L (ref 132–146)
SPECIFIC GRAVITY UA: 1.01 (ref 1–1.03)
TOTAL PROTEIN: 7.1 G/DL (ref 6.4–8.3)
TRICYCLIC ANTIDEPRESSANTS SCREEN SERUM: NEGATIVE NG/ML
UROBILINOGEN, URINE: 0.2 E.U./DL
WBC # BLD: 9.9 E9/L (ref 4.5–11.5)
WBC UA: ABNORMAL /HPF (ref 0–5)

## 2022-08-10 PROCEDURE — 81001 URINALYSIS AUTO W/SCOPE: CPT

## 2022-08-10 PROCEDURE — 80307 DRUG TEST PRSMV CHEM ANLYZR: CPT

## 2022-08-10 PROCEDURE — 70450 CT HEAD/BRAIN W/O DYE: CPT

## 2022-08-10 PROCEDURE — 85025 COMPLETE CBC W/AUTO DIFF WBC: CPT

## 2022-08-10 PROCEDURE — 82077 ASSAY SPEC XCP UR&BREATH IA: CPT

## 2022-08-10 PROCEDURE — 80053 COMPREHEN METABOLIC PANEL: CPT

## 2022-08-10 PROCEDURE — 93005 ELECTROCARDIOGRAM TRACING: CPT | Performed by: EMERGENCY MEDICINE

## 2022-08-10 PROCEDURE — 80143 DRUG ASSAY ACETAMINOPHEN: CPT

## 2022-08-10 PROCEDURE — 99285 EMERGENCY DEPT VISIT HI MDM: CPT

## 2022-08-10 PROCEDURE — 6370000000 HC RX 637 (ALT 250 FOR IP): Performed by: PSYCHIATRY & NEUROLOGY

## 2022-08-10 PROCEDURE — 82962 GLUCOSE BLOOD TEST: CPT

## 2022-08-10 PROCEDURE — 87635 SARS-COV-2 COVID-19 AMP PRB: CPT

## 2022-08-10 PROCEDURE — 80179 DRUG ASSAY SALICYLATE: CPT

## 2022-08-10 PROCEDURE — 1240000000 HC EMOTIONAL WELLNESS R&B

## 2022-08-10 RX ORDER — CLOPIDOGREL BISULFATE 75 MG/1
75 TABLET ORAL DAILY
COMMUNITY

## 2022-08-10 RX ORDER — HALOPERIDOL 2 MG/1
3 TABLET ORAL EVERY 6 HOURS PRN
Status: DISCONTINUED | OUTPATIENT
Start: 2022-08-10 | End: 2022-08-16 | Stop reason: HOSPADM

## 2022-08-10 RX ORDER — INSULIN LISPRO 100 [IU]/ML
0-4 INJECTION, SOLUTION INTRAVENOUS; SUBCUTANEOUS NIGHTLY
Status: DISCONTINUED | OUTPATIENT
Start: 2022-08-10 | End: 2022-08-16 | Stop reason: HOSPADM

## 2022-08-10 RX ORDER — LANOLIN ALCOHOL/MO/W.PET/CERES
3 CREAM (GRAM) TOPICAL NIGHTLY
Status: DISCONTINUED | OUTPATIENT
Start: 2022-08-10 | End: 2022-08-11

## 2022-08-10 RX ORDER — CLOPIDOGREL BISULFATE 75 MG/1
75 TABLET ORAL DAILY
Status: DISCONTINUED | OUTPATIENT
Start: 2022-08-10 | End: 2022-08-16 | Stop reason: HOSPADM

## 2022-08-10 RX ORDER — HALOPERIDOL 5 MG/ML
3 INJECTION INTRAMUSCULAR EVERY 6 HOURS PRN
Status: DISCONTINUED | OUTPATIENT
Start: 2022-08-10 | End: 2022-08-16 | Stop reason: HOSPADM

## 2022-08-10 RX ORDER — ATORVASTATIN CALCIUM 40 MG/1
40 TABLET, FILM COATED ORAL DAILY
Status: DISCONTINUED | OUTPATIENT
Start: 2022-08-10 | End: 2022-08-16 | Stop reason: HOSPADM

## 2022-08-10 RX ORDER — INSULIN LISPRO 100 [IU]/ML
0-16 INJECTION, SOLUTION INTRAVENOUS; SUBCUTANEOUS
Status: DISCONTINUED | OUTPATIENT
Start: 2022-08-10 | End: 2022-08-16 | Stop reason: HOSPADM

## 2022-08-10 RX ORDER — CHLORDIAZEPOXIDE HYDROCHLORIDE 5 MG/1
10 CAPSULE, GELATIN COATED ORAL 4 TIMES DAILY PRN
Status: DISCONTINUED | OUTPATIENT
Start: 2022-08-10 | End: 2022-08-11

## 2022-08-10 RX ORDER — PANCRELIPASE 36000; 180000; 114000 [USP'U]/1; [USP'U]/1; [USP'U]/1
2 CAPSULE, DELAYED RELEASE PELLETS ORAL
COMMUNITY

## 2022-08-10 RX ORDER — ATENOLOL 50 MG/1
50 TABLET ORAL DAILY
Status: DISCONTINUED | OUTPATIENT
Start: 2022-08-10 | End: 2022-08-16 | Stop reason: HOSPADM

## 2022-08-10 RX ORDER — TRAZODONE HYDROCHLORIDE 50 MG/1
50 TABLET ORAL NIGHTLY
Status: ON HOLD | COMMUNITY
End: 2022-08-16 | Stop reason: HOSPADM

## 2022-08-10 RX ORDER — CITALOPRAM 20 MG/1
40 TABLET ORAL DAILY
Status: DISCONTINUED | OUTPATIENT
Start: 2022-08-10 | End: 2022-08-11

## 2022-08-10 RX ORDER — ACETAMINOPHEN 325 MG/1
650 TABLET ORAL EVERY 4 HOURS PRN
Status: DISCONTINUED | OUTPATIENT
Start: 2022-08-10 | End: 2022-08-16 | Stop reason: HOSPADM

## 2022-08-10 RX ORDER — MAGNESIUM HYDROXIDE/ALUMINUM HYDROXICE/SIMETHICONE 120; 1200; 1200 MG/30ML; MG/30ML; MG/30ML
30 SUSPENSION ORAL PRN
Status: DISCONTINUED | OUTPATIENT
Start: 2022-08-10 | End: 2022-08-16 | Stop reason: HOSPADM

## 2022-08-10 RX ORDER — DEXTROSE MONOHYDRATE 25 G/50ML
25 INJECTION, SOLUTION INTRAVENOUS PRN
Status: DISCONTINUED | OUTPATIENT
Start: 2022-08-10 | End: 2022-08-10

## 2022-08-10 RX ORDER — DIVALPROEX SODIUM 250 MG/1
250 TABLET, DELAYED RELEASE ORAL 2 TIMES DAILY
Status: DISCONTINUED | OUTPATIENT
Start: 2022-08-10 | End: 2022-08-11

## 2022-08-10 RX ORDER — PANTOPRAZOLE SODIUM 40 MG/1
40 TABLET, DELAYED RELEASE ORAL
Status: DISCONTINUED | OUTPATIENT
Start: 2022-08-11 | End: 2022-08-16 | Stop reason: HOSPADM

## 2022-08-10 RX ORDER — HYDROXYZINE HYDROCHLORIDE 10 MG/1
50 TABLET, FILM COATED ORAL 3 TIMES DAILY PRN
Status: DISCONTINUED | OUTPATIENT
Start: 2022-08-10 | End: 2022-08-16 | Stop reason: HOSPADM

## 2022-08-10 RX ORDER — DEXTROSE MONOHYDRATE 100 MG/ML
INJECTION, SOLUTION INTRAVENOUS CONTINUOUS PRN
Status: DISCONTINUED | OUTPATIENT
Start: 2022-08-10 | End: 2022-08-16 | Stop reason: HOSPADM

## 2022-08-10 RX ORDER — NICOTINE 21 MG/24HR
1 PATCH, TRANSDERMAL 24 HOURS TRANSDERMAL DAILY
Status: DISCONTINUED | OUTPATIENT
Start: 2022-08-10 | End: 2022-08-11

## 2022-08-10 RX ORDER — DEXTROSE MONOHYDRATE 25 G/50ML
25 INJECTION, SOLUTION INTRAVENOUS ONCE
Status: DISCONTINUED | OUTPATIENT
Start: 2022-08-10 | End: 2022-08-10

## 2022-08-10 RX ADMIN — DIVALPROEX SODIUM 250 MG: 250 TABLET, DELAYED RELEASE ORAL at 13:58

## 2022-08-10 ASSESSMENT — SLEEP AND FATIGUE QUESTIONNAIRES
DO YOU HAVE DIFFICULTY SLEEPING: YES
SLEEP PATTERN: DIFFICULTY FALLING ASLEEP
DO YOU USE A SLEEP AID: YES
SLEEP PATTERN: DIFFICULTY FALLING ASLEEP;RESTLESSNESS
AVERAGE NUMBER OF SLEEP HOURS: 7
AVERAGE NUMBER OF SLEEP HOURS: 7
DO YOU HAVE DIFFICULTY SLEEPING: YES
DO YOU USE A SLEEP AID: YES

## 2022-08-10 ASSESSMENT — LIFESTYLE VARIABLES
HOW MANY STANDARD DRINKS CONTAINING ALCOHOL DO YOU HAVE ON A TYPICAL DAY: PATIENT DOES NOT DRINK
HOW OFTEN DO YOU HAVE A DRINK CONTAINING ALCOHOL: NEVER
HOW MANY STANDARD DRINKS CONTAINING ALCOHOL DO YOU HAVE ON A TYPICAL DAY: PATIENT DOES NOT DRINK
HOW OFTEN DO YOU HAVE A DRINK CONTAINING ALCOHOL: NEVER

## 2022-08-10 NOTE — PROGRESS NOTES
Received call back from Dr. Valarie Friedman' office. Called answering service at [phone] regarding new consult and  will page doctor.
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
## 2022-08-10 NOTE — ED NOTES
Dried stool noted to pt's lags and [place] area. Ambulated to br with steady gait and assisted and cleaning pt. Attempted to void without success at this time. Back to cart with CO present and water given to pt.       Della Flores RN  08/10/22 8069
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
## 2022-08-10 NOTE — ED NOTES
Additional water given to pt. Tolerating well. CO remains present at bedside. No distress noted.       Won Calixto RN  08/10/22 8145
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
## 2022-08-10 NOTE — CARE COORDINATION
Social Work  / Transition of Care:    SW received call from [place] at [facility]. If pt is to return home, APS will need notified at [phone]. MIKO in [facility] AN [facility]FFIL[facility]ATE OF [facility].

## 2022-08-10 NOTE — ED NOTES
Per ED MD feed patient and hold off on D50 at this time. Patient given apple juice and soup.      Tate Lugo RN  08/10/22 1120
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
## 2022-08-10 NOTE — ED PROVIDER NOTES
trismus  Neck: Supple, full ROM, non tender to palpation in the midline, no stridor, no crepitus, no meningeal signs  Pulmonary: Lungs clear to auscultation bilaterally, no wheezes, rales, or rhonchi. Not in respiratory distress  Cardiovascular:  Regular rate. Regular rhythm. No murmurs, gallops, or rubs. 2+ distal pulses  Chest: no chest wall tenderness  Abdomen: Soft. Non tender. Non distended. +BS. No rebound, guarding, or rigidity. No pulsatile masses appreciated. Musculoskeletal: Moves all extremities x 4. Warm and well perfused, no clubbing, cyanosis, or edema. Capillary refill <3 seconds  Skin: warm and dry. No rashes. Neurologic: GCS 15, CN 2-12 grossly intact, no focal deficits, symmetric strength 5/5 in the upper and lower extremities bilaterally  Psych:     -------------------------------------------------- RESULTS -------------------------------------------------  I have personally reviewed all laboratory and imaging results for this patient. Results are listed below.      LABS:  Results for orders placed or performed during the hospital encounter of 08/10/22   CBC with Auto Differential   Result Value Ref Range    WBC 9.9 4.5 - 11.5 E9/L    RBC 4.98 3.50 - 5.50 E12/L    Hemoglobin 13.0 11.5 - 15.5 g/dL    Hematocrit 42.3 34.0 - 48.0 %    MCV 84.9 80.0 - 99.9 fL    MCH 26.1 26.0 - 35.0 pg    MCHC 30.7 (L) 32.0 - 34.5 %    RDW 17.2 (H) 11.5 - 15.0 fL    Platelets 400 009 - 727 E9/L    MPV 10.1 7.0 - 12.0 fL    Neutrophils % 79.2 43.0 - 80.0 %    Immature Granulocytes % 0.6 0.0 - 5.0 %    Lymphocytes % 12.7 (L) 20.0 - 42.0 %    Monocytes % 7.1 2.0 - 12.0 %    Eosinophils % 0.0 0.0 - 6.0 %    Basophils % 0.4 0.0 - 2.0 %    Neutrophils Absolute 7.83 (H) 1.80 - 7.30 E9/L    Immature Granulocytes # 0.06 E9/L    Lymphocytes Absolute 1.25 (L) 1.50 - 4.00 E9/L    Monocytes Absolute 0.70 0.10 - 0.95 E9/L    Eosinophils Absolute 0.00 (L) 0.05 - 0.50 E9/L    Basophils Absolute 0.04 0.00 - 0.20 E9/L   EKG 12 Lead   Result Value Ref Range    Ventricular Rate 79 BPM    Atrial Rate 79 BPM    P-R Interval 144 ms    QRS Duration 80 ms    Q-T Interval 408 ms    QTc Calculation (Bazett) 467 ms    P Axis 41 degrees    R Axis -60 degrees    T Axis 41 degrees       RADIOLOGY:  Interpreted by Radiologist.  [address]    (Results Pending)     EKG: This EKG is signed and interpreted by me. Rate: 79  Rhythm: Sinus  Interpretation: non-specific EKG  Comparison: stable as compared to patient's most recent EKG EKG was from May 2022        ------------------------- NURSING NOTES AND VITALS REVIEWED ---------------------------   The nursing notes within the ED encounter and vital signs as below have been reviewed by myself. There were no vitals taken for this visit. Oxygen Saturation Interpretation: Normal    The patients available past medical records and past encounters were reviewed. ------------------------------ ED COURSE/MEDICAL DECISION MAKING----------------------  Medications - No data to display          Medical Decision Making:    Patient was brought in by EMS. Patient reportedly was screaming and yelling and reportedly the police that she was being kidnapped. Patient arrived here she is awake alert. Patient moving all extremities. She is oriented x3. Patient reporting no physical planes of chest pain shortness of breath or abdominal pain. Patient was pink slipped by police pink slip was reviewed. Patient labs will be reviewed plan will be for  to evaluate. Re-Evaluations:             Re-evaluation. Patients symptoms show no change      Consultations:                 Critical Care: This patient's ED course included: a personal history and physicial eaxmination    This patient has been closely monitored during their ED course. Counseling:    The emergency provider has spoken with the  and discussed todays results, in addition to providing specific details for the plan of care and counseling regarding the diagnosis and prognosis. Questions are answered at this time and they are agreeable with the plan.       --------------------------------- IMPRESSION AND DISPOSITION ---------------------------------    IMPRESSION  1. Encounter for psychiatric assessment        DISPOSITION  Disposition: admit  Patient condition is stable        NOTE: This report was transcribed using voice recognition software.  Every effort was made to ensure accuracy; however, inadvertent computerized transcription errors may be present          Ralph Macias MD  08/10/22 1237       Ralph Macias MD  08/10/22 9785       Ralph Macias MD  08/10/22 2444

## 2022-08-10 NOTE — ED PROVIDER NOTES
Patient was signed out to me awaiting laboratory testing for medical clearance. Pink slip was initiated, would benefit from mental health evaluation and admission. Metabolic panel is within acceptable limits, no leukocytosis or anemia. COVID testing is negative. CT head shows no acute abnormality. Patient is medically clear for mental health evaluation and admission.      Brooklynn Dukes, DO  08/10/22 916 Kerry Weston, DO  08/10/22 5266

## 2022-08-10 NOTE — ED NOTES
Nurse to nurse report given to [facility] on [address], RN  08/10/22 6973

## 2022-08-10 NOTE — PROGRESS NOTES
Attempted to call Dr. Catherine Gowers' answering service multiple times and unable to reach an . Called answering service back and left message with unit call back number for consult. Chart review also reveals that Dr. Marilee Peter has followed patient in the past. Message sent to Dr. Marilee Peter via [employer] regarding new medical consult as well to see if he covers for Dr. Catherine Gowers. Will await a response.

## 2022-08-10 NOTE — PROGRESS NOTES
Unable to complete MOCA at this time. Patient loud, delusional, and observed interacting with unseen others. Patient unable to concentrate or be verbally redirected to participate in assessment. Will re-attempt at a later time.

## 2022-08-11 PROBLEM — F12.10 CANNABIS ABUSE: Status: ACTIVE | Noted: 2022-08-11

## 2022-08-11 PROBLEM — F31.2 SEVERE MANIC BIPOLAR 1 DISORDER WITH PSYCHOTIC BEHAVIOR (HCC): Status: ACTIVE | Noted: 2022-08-11

## 2022-08-11 LAB
ANION GAP SERPL CALCULATED.3IONS-SCNC: 9 MMOL/L (ref 7–16)
BUN BLDV-MCNC: 13 MG/DL (ref 6–23)
CALCIUM SERPL-MCNC: 9 MG/DL (ref 8.6–10.2)
CHLORIDE BLD-SCNC: 103 MMOL/L (ref 98–107)
CHOLESTEROL, TOTAL: 134 MG/DL (ref 0–199)
CO2: 28 MMOL/L (ref 22–29)
CREAT SERPL-MCNC: 0.8 MG/DL (ref 0.5–1)
GFR AFRICAN AMERICAN: >60
GFR NON-AFRICAN AMERICAN: >60 ML/MIN/1.73
GLUCOSE BLD-MCNC: 98 MG/DL (ref 74–99)
HBA1C MFR BLD: 6.8 % (ref 4–5.6)
HDLC SERPL-MCNC: 39 MG/DL
LDL CHOLESTEROL CALCULATED: 80 MG/DL (ref 0–99)
MAGNESIUM: 1.7 MG/DL (ref 1.6–2.6)
METER GLUCOSE: 117 MG/DL (ref 74–99)
METER GLUCOSE: 142 MG/DL (ref 74–99)
METER GLUCOSE: 158 MG/DL (ref 74–99)
METER GLUCOSE: 169 MG/DL (ref 74–99)
METER GLUCOSE: 96 MG/DL (ref 74–99)
POTASSIUM REFLEX MAGNESIUM: 3.1 MMOL/L (ref 3.5–5)
SODIUM BLD-SCNC: 140 MMOL/L (ref 132–146)
TRIGL SERPL-MCNC: 76 MG/DL (ref 0–149)
VLDLC SERPL CALC-MCNC: 15 MG/DL

## 2022-08-11 PROCEDURE — 80061 LIPID PANEL: CPT

## 2022-08-11 PROCEDURE — 80048 BASIC METABOLIC PNL TOTAL CA: CPT

## 2022-08-11 PROCEDURE — 36415 COLL VENOUS BLD VENIPUNCTURE: CPT

## 2022-08-11 PROCEDURE — 6370000000 HC RX 637 (ALT 250 FOR IP): Performed by: NURSE PRACTITIONER

## 2022-08-11 PROCEDURE — 90792 PSYCH DIAG EVAL W/MED SRVCS: CPT | Performed by: NURSE PRACTITIONER

## 2022-08-11 PROCEDURE — 83735 ASSAY OF MAGNESIUM: CPT

## 2022-08-11 PROCEDURE — 1240000000 HC EMOTIONAL WELLNESS R&B

## 2022-08-11 PROCEDURE — 6370000000 HC RX 637 (ALT 250 FOR IP): Performed by: PSYCHIATRY & NEUROLOGY

## 2022-08-11 PROCEDURE — 6370000000 HC RX 637 (ALT 250 FOR IP): Performed by: INTERNAL MEDICINE

## 2022-08-11 PROCEDURE — 82962 GLUCOSE BLOOD TEST: CPT

## 2022-08-11 PROCEDURE — 83036 HEMOGLOBIN GLYCOSYLATED A1C: CPT

## 2022-08-11 RX ORDER — RISPERIDONE 0.5 MG/1
0.5 TABLET, ORALLY DISINTEGRATING ORAL 2 TIMES DAILY
Status: DISCONTINUED | OUTPATIENT
Start: 2022-08-11 | End: 2022-08-16 | Stop reason: HOSPADM

## 2022-08-11 RX ORDER — OXCARBAZEPINE 300 MG/1
300 TABLET, FILM COATED ORAL 2 TIMES DAILY
Status: DISCONTINUED | OUTPATIENT
Start: 2022-08-11 | End: 2022-08-16 | Stop reason: HOSPADM

## 2022-08-11 RX ORDER — LANOLIN ALCOHOL/MO/W.PET/CERES
6 CREAM (GRAM) TOPICAL DAILY
Status: DISCONTINUED | OUTPATIENT
Start: 2022-08-11 | End: 2022-08-16 | Stop reason: HOSPADM

## 2022-08-11 RX ORDER — CHLORDIAZEPOXIDE HYDROCHLORIDE 5 MG/1
10 CAPSULE, GELATIN COATED ORAL 2 TIMES DAILY PRN
Status: DISCONTINUED | OUTPATIENT
Start: 2022-08-11 | End: 2022-08-16 | Stop reason: HOSPADM

## 2022-08-11 RX ORDER — POTASSIUM CHLORIDE 20 MEQ/1
20 TABLET, EXTENDED RELEASE ORAL 2 TIMES DAILY WITH MEALS
Status: DISCONTINUED | OUTPATIENT
Start: 2022-08-11 | End: 2022-08-16 | Stop reason: HOSPADM

## 2022-08-11 RX ADMIN — OXCARBAZEPINE 300 MG: 300 TABLET, FILM COATED ORAL at 20:45

## 2022-08-11 RX ADMIN — RISPERIDONE 0.5 MG: 0.5 TABLET, ORALLY DISINTEGRATING ORAL at 13:18

## 2022-08-11 RX ADMIN — HALOPERIDOL 3 MG: 2 TABLET ORAL at 08:37

## 2022-08-11 RX ADMIN — POTASSIUM CHLORIDE 20 MEQ: 20 TABLET, EXTENDED RELEASE ORAL at 17:59

## 2022-08-11 RX ADMIN — ATENOLOL 50 MG: 50 TABLET ORAL at 08:36

## 2022-08-11 RX ADMIN — METFORMIN HYDROCHLORIDE 1000 MG: 500 TABLET ORAL at 08:37

## 2022-08-11 RX ADMIN — CITALOPRAM HYDROBROMIDE 40 MG: 20 TABLET ORAL at 08:37

## 2022-08-11 RX ADMIN — MELATONIN 3 MG ORAL TABLET 6 MG: 3 TABLET ORAL at 17:59

## 2022-08-11 RX ADMIN — PANTOPRAZOLE SODIUM 40 MG: 40 TABLET, DELAYED RELEASE ORAL at 08:37

## 2022-08-11 RX ADMIN — METFORMIN HYDROCHLORIDE 1000 MG: 500 TABLET ORAL at 17:59

## 2022-08-11 RX ADMIN — POTASSIUM CHLORIDE 20 MEQ: 20 TABLET, EXTENDED RELEASE ORAL at 09:35

## 2022-08-11 RX ADMIN — CLOPIDOGREL BISULFATE 75 MG: 75 TABLET, FILM COATED ORAL at 08:37

## 2022-08-11 RX ADMIN — ATORVASTATIN CALCIUM 40 MG: 40 TABLET, FILM COATED ORAL at 08:37

## 2022-08-11 RX ADMIN — DIVALPROEX SODIUM 250 MG: 250 TABLET, DELAYED RELEASE ORAL at 08:37

## 2022-08-11 RX ADMIN — RISPERIDONE 0.5 MG: 0.5 TABLET, ORALLY DISINTEGRATING ORAL at 20:45

## 2022-08-11 RX ADMIN — OXCARBAZEPINE 300 MG: 300 TABLET, FILM COATED ORAL at 13:18

## 2022-08-11 NOTE — PLAN OF CARE
Problem: Safety - Adult  Goal: Free from fall injury  Outcome: Progressing     Problem: Confusion  Goal: Confusion, delirium, dementia, or psychosis is improved or at baseline  Description: INTERVENTIONS:  1. Assess for possible contributors to thought disturbance, including medications, impaired vision or hearing, underlying metabolic abnormalities, dehydration, psychiatric diagnoses, and notify attending LIP  2. [place] high risk fall precautions, as indicated  3. Provide frequent short contacts to provide reality reorientation, refocusing and direction  4. Decrease environmental stimuli, including noise as appropriate  5. Monitor and intervene to maintain adequate nutrition, hydration, elimination, sleep and activity  6. If unable to ensure safety without constant attention obtain sitter and review sitter guidelines with assigned personnel  7. Initiate Psychosocial CNS and Spiritual Care consult, as indicated  Outcome: Not Progressing   Pt is in day area sitting quietly. Pt denies SI/HI or AVH. Pt refused HS medications and states that she will be staying in the day area all night to wait for the doctor in the AM. Pt can be loud, at times yelling when she talks. Pt was compliant with vitals and blood glucose check. Will continue to monitor throughout the shift.

## 2022-08-11 NOTE — PLAN OF CARE
Problem: Anxiety  Goal: Will report anxiety at manageable levels  Description: INTERVENTIONS:  1. Administer medication as ordered  2. Teach and rehearse alternative coping skills  3. Provide emotional support with 1:1 interaction with staff  Outcome: Progressing     Problem: Confusion  Goal: Confusion, delirium, dementia, or psychosis is improved or at baseline  Description: INTERVENTIONS:  1. Assess for possible contributors to thought disturbance, including medications, impaired vision or hearing, underlying metabolic abnormalities, dehydration, psychiatric diagnoses, and notify attending LIP  2. [place] high risk fall precautions, as indicated  3. Provide frequent short contacts to provide reality reorientation, refocusing and direction  4. Decrease environmental stimuli, including noise as appropriate  5. Monitor and intervene to maintain adequate nutrition, hydration, elimination, sleep and activity  6. If unable to ensure safety without constant attention obtain sitter and review sitter guidelines with assigned personnel  7. Initiate Psychosocial CNS and Spiritual Care consult, as indicated  Outcome: Progressing     Problem: Behavior  Goal: Pt/Family maintain appropriate behavior and adhere to behavioral management agreement, if implemented  Description: INTERVENTIONS:  1. Assess patient/family's coping skills and  non-compliant behavior (including use of illegal substances)  2. Notify security of behavior or suspected illegal substances which indicate the need for search of the family and/or belongings  3. Encourage verbalization of thoughts and concerns in a socially appropriate manner  4. Utilize positive, consistent limit setting strategies supporting safety of patient, staff and others  5. Encourage participation in the decision making process about the behavioral management agreement  6. If a visitor's behavior poses a threat to safety call refer to organization policy.   7. Initiate consult with , Psychosocial CNS, Spiritual Care as appropriate  Outcome: Progressing     Pt denies SI/HI and AVH. Pt is very confused and delusional. She saw her cat sitting on the window sill outside on the third floor and was even tearful thinking it was in trouble. She also talked about the puppies on the unit. She has taken her medications today except for the Creon. She gets irritable at times because she wants to leave but no aggression. She scored a 14 on her MoCA. Will continue to monitor and provide support.

## 2022-08-11 NOTE — CARE COORDINATION
SW received a call from pt's son, Yaneth Lowe ([phone]). SW received verbal consent from this pt to speak with Robbie (observed by this SW and a RN). SW called and LVM.

## 2022-08-11 NOTE — GROUP NOTE
Group Therapy Note    Date: 8/11/2022    Group Start Time: 1115  Group End Time: 1267  Group Topic: Cognitive Skills    [facility]  [address], MSW, LSW        Group Therapy Note    Attendees: 5    Patient's Goal:  Pt will be able to verbalize understanding of healthy boundaries, and the importance of setting boundaries. Notes:  Pt made connections and participated in group.     Status After Intervention:  Unchanged    Participation Level: Minimal    Participation Quality: Appropriate and Attentive      Speech:  normal      Thought Process/Content: Flight of ideas      Affective Functioning: Exaggerated      Mood: elevated      Level of consciousness:  Alert and Attentive      Response to Learning: Able to verbalize current knowledge/experience      Endings: None Reported    Modes of Intervention: Education      Discipline Responsible: /Counselor      Signature:  MARIA E Paniagua, LSW

## 2022-08-11 NOTE — CONSULTS
Mckayla Jennings is a 70-year-old woman that over the last 3 to 6 months has been getting more depressed she seemed to be responding to citalopram but the nagging problem was her inability to sleep well. The reason for the sleep deprivation was worrying about her daughter and her job. I did not know about the cannabis medication and I assume it was due to medical marijuana prescription I have very suspicious that the cannabis has induced a acute psychosis where she is paranoid and at times hallucinating  She has fairly controlled diabetes mellitus and easily could have had hypoglycemic attacks as I doubt if she was eating and drinking normally. I also question whether she was taking her medicines as she was on a significant dosage of insulin in the neighborhood of 70 units a day  Over the last month she has had chronic diarrhea complaints and was evaluated by gastroenterology and apparently diagnosed with exocrine pancreatic insufficiency. It is unknown if the enzymes have helped her. At any rate she has lost 30 pounds which improved her diabetes but still I am wondering if there is some other etiology to her mental status. 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 She has had a previous bout of pancreatitis and has had a cholecystectomy. She does not smoke and does not drink alcohol and the cannabis is new  Family history significant for parents having atherosclerotic vascular disease  As of 2 months ago this woman was fully functional and taking care of her house shopping and handling all her affairs  Her mental status she recognizes me correctly, she is not able to state the location where she is at. She seems to remember the dates of her most recent Crouzon's.   She is paranoid that medication will have something in it and she maintains she has been kidnapped. Lungs are clear to auscultation, heart S1-S2 no murmur regular rhythm abdomen is soft and nontender legs show no rashes and no swelling  My impression is acute psychosis with acute preet most likely caused by cannabis. I cannot rule out underlying cognitive dysfunction developing she has diabetes mellitus and atherosclerotic vascular disease diffuse but no evidence of cerebrovascular accident.   Plan recommend attempt to reinforce reality and reintroduce her medications

## 2022-08-11 NOTE — BH NOTE
Patient alert, quiet, and sitting in dining area. Politely refused all hs medication x 3 attempts. \"I am not going to take any medicine until I see my son and the doctor in the morning, thank you. \" No distress voiced or apparent at this time. Will continue to monitor per unit protocol.

## 2022-08-11 NOTE — CARE COORDINATION
SW Supervisor received voicemail from pt's Talon Merino ([phone]) asking if he can visit the patient. SW Supervisor discussed with patient, who stated \"yes, Arthurine Lius A is going to break me out of this place\". SW Supervisor explained that if Arthhannah Luis A comes to visit, she will not be leaving with him. Pt was not receptive to the information and was focused on leaving the hospital. Pt gave SW Supervisor verbal consent to call Wayne Gunn ([phone]). SAMY called Talon Merino, no answer, left voicemail.     MARIA E Yanes, [facility] 19 Work Supervisor

## 2022-08-11 NOTE — PLAN OF CARE
Problem: Safety - Adult  Goal: Free from fall injury  Outcome: Progressing     Problem: Confusion  Goal: Confusion, delirium, dementia, or psychosis is improved or at baseline  Description: INTERVENTIONS:  1. Assess for possible contributors to thought disturbance, including medications, impaired vision or hearing, underlying metabolic abnormalities, dehydration, psychiatric diagnoses, and notify attending LIP  2. [place] high risk fall precautions, as indicated  3. Provide frequent short contacts to provide reality reorientation, refocusing and direction  4. Decrease environmental stimuli, including noise as appropriate  5. Monitor and intervene to maintain adequate nutrition, hydration, elimination, sleep and activity  6. If unable to ensure safety without constant attention obtain sitter and review sitter guidelines with assigned personnel  7. Initiate Psychosocial CNS and Spiritual Care consult, as indicated  Outcome: Not Progressing     Problem: Confusion  Goal: Confusion, delirium, dementia, or psychosis is improved or at baseline  Description: INTERVENTIONS:  1. Assess for possible contributors to thought disturbance, including medications, impaired vision or hearing, underlying metabolic abnormalities, dehydration, psychiatric diagnoses, and notify attending LIP  2. [place] high risk fall precautions, as indicated  3. Provide frequent short contacts to provide reality reorientation, refocusing and direction  4. Decrease environmental stimuli, including noise as appropriate  5. Monitor and intervene to maintain adequate nutrition, hydration, elimination, sleep and activity  6. If unable to ensure safety without constant attention obtain sitter and review sitter guidelines with assigned personnel  7.  Initiate Psychosocial CNS and Spiritual Care consult, as indicated  Outcome: Not Progressing

## 2022-08-11 NOTE — PROGRESS NOTES
[facility]  Initial Interdisciplinary Treatment Plan NOTE  /  Review Date & Time: 8/11/22 0850    Patient was in treatment team    Admission Type:   Admission Type: Involuntary    Reason for admission:  Reason for Admission: \"I'm not crazy, no one will believe me. \"      Estimated Length of Stay Update:  3-5 days  Estimated Discharge Date Update: 8/15/22    EDUCATION:   Learner Progress Toward Treatment Goals: Reviewed goals and plan of care    Method: Small group    Outcome: Verbalized understanding    PATIENT GOALS: \"I want to go home\"    PLAN/TREATMENT RECOMMENDATIONS UPDATE:Encourage group participation and medication compliance. Provide emotional support as needed.     GOALS UPDATE:   Time frame for Short-Term Goals: 1-3 days    Magdalena Rendon RN

## 2022-08-11 NOTE — PROGRESS NOTES
Pt has been delusional and grandiose all day. She talks on the phone with family and tells them she hasn't been fed, her sugar is shannen high, she isn't being watched or being taken care of. Of all these are not true. She remains confused. Will continue to montior and provide support.

## 2022-08-11 NOTE — PROGRESS NOTES
Patient resting quiet to self at this time, awake and in chair in day area, refusing to go to sleep in her room. Pt requested a \"coke in a bottle\" but refused anything from a cup because \"you will put something in it\". Pt is paranoid and watching surroundings and refused HS medications. Pt denies pain and no signs or symptoms of distress or discomfort noted. Staff will continue to conduct environmental rounds to ensure the safety of everyone on the unit. Staff will provide support and interventions as requested or required.

## 2022-08-11 NOTE — CARE COORDINATION
SAMY spoke with pt's son, Paula Graham. Paula Graham states that he usually lives in [place], but is here in [place] taking care of this pt's cat. Paula Graham states that the home is in good condition, and he sees no signs of a break in like the pt is claiming. Paula Santos states that the pt has been \"slightly\" confused the last couple of months, but nothing out of the ordinary. Paula Santos states that this pt has never displayed any behavior like this in the past. Paula Santos states that this pt has also never displayed signs or symptoms of mental health illness. Paula Kelseytony states that he was concerned that this pt may have a UTI, or is having diabetes complications. Paula Kelseytony states that he or his sister will provide transportation for this pt at discharge. No access to weapons per Paula Graham. SAMY will continue to assist as needed.

## 2022-08-11 NOTE — CARE COORDINATION
SW met with this pt for a daily assessment. Pt was observed standing in the dayroom, looking out the window. When this SW approached this pt, she stated \"well, what do you want? \" Pt then became more polite, saying that her son is coming to get her today. Pt also stating that she sees her cat resting on the window panel. Pt appears delusional and confused. Pt is currently denying SI/ HI/ hallucinations/ delusions. Pt states that she will be returning home where she resides alone. Collateral needed from pt's daughter, Asif Henley. Transportation TBD. Pt continues to state that she just wants to continue to treat with her PCP. SW will continue to monitor this pt's moods and behaviors. SW left another VM for this pt's daughter, Asif Henley.

## 2022-08-12 LAB
METER GLUCOSE: 100 MG/DL (ref 74–99)
METER GLUCOSE: 102 MG/DL (ref 74–99)
METER GLUCOSE: 112 MG/DL (ref 74–99)
METER GLUCOSE: 116 MG/DL (ref 74–99)
METER GLUCOSE: 86 MG/DL (ref 74–99)
METER GLUCOSE: 96 MG/DL (ref 74–99)

## 2022-08-12 PROCEDURE — 6370000000 HC RX 637 (ALT 250 FOR IP): Performed by: NURSE PRACTITIONER

## 2022-08-12 PROCEDURE — 6370000000 HC RX 637 (ALT 250 FOR IP): Performed by: PSYCHIATRY & NEUROLOGY

## 2022-08-12 PROCEDURE — 6370000000 HC RX 637 (ALT 250 FOR IP): Performed by: INTERNAL MEDICINE

## 2022-08-12 PROCEDURE — 99231 SBSQ HOSP IP/OBS SF/LOW 25: CPT | Performed by: NURSE PRACTITIONER

## 2022-08-12 PROCEDURE — 82962 GLUCOSE BLOOD TEST: CPT

## 2022-08-12 PROCEDURE — 1240000000 HC EMOTIONAL WELLNESS R&B

## 2022-08-12 RX ORDER — LOPERAMIDE HYDROCHLORIDE 2 MG/1
2 CAPSULE ORAL 4 TIMES DAILY PRN
Status: DISCONTINUED | OUTPATIENT
Start: 2022-08-12 | End: 2022-08-16 | Stop reason: HOSPADM

## 2022-08-12 RX ADMIN — OXCARBAZEPINE 300 MG: 300 TABLET, FILM COATED ORAL at 20:58

## 2022-08-12 RX ADMIN — HYDROXYZINE HYDROCHLORIDE 50 MG: 10 TABLET ORAL at 20:57

## 2022-08-12 RX ADMIN — OXCARBAZEPINE 300 MG: 300 TABLET, FILM COATED ORAL at 10:25

## 2022-08-12 RX ADMIN — PANTOPRAZOLE SODIUM 40 MG: 40 TABLET, DELAYED RELEASE ORAL at 06:51

## 2022-08-12 RX ADMIN — POTASSIUM CHLORIDE 20 MEQ: 20 TABLET, EXTENDED RELEASE ORAL at 10:24

## 2022-08-12 RX ADMIN — ATENOLOL 50 MG: 50 TABLET ORAL at 10:24

## 2022-08-12 RX ADMIN — ATORVASTATIN CALCIUM 40 MG: 40 TABLET, FILM COATED ORAL at 10:24

## 2022-08-12 RX ADMIN — RISPERIDONE 0.5 MG: 0.5 TABLET, ORALLY DISINTEGRATING ORAL at 20:57

## 2022-08-12 RX ADMIN — MELATONIN 3 MG ORAL TABLET 6 MG: 3 TABLET ORAL at 17:42

## 2022-08-12 RX ADMIN — POTASSIUM CHLORIDE 20 MEQ: 20 TABLET, EXTENDED RELEASE ORAL at 17:43

## 2022-08-12 RX ADMIN — METFORMIN HYDROCHLORIDE 1000 MG: 500 TABLET ORAL at 10:24

## 2022-08-12 RX ADMIN — LOPERAMIDE HYDROCHLORIDE 2 MG: 2 CAPSULE ORAL at 17:09

## 2022-08-12 RX ADMIN — CLOPIDOGREL BISULFATE 75 MG: 75 TABLET, FILM COATED ORAL at 10:24

## 2022-08-12 RX ADMIN — RISPERIDONE 0.5 MG: 0.5 TABLET, ORALLY DISINTEGRATING ORAL at 10:25

## 2022-08-12 RX ADMIN — METFORMIN HYDROCHLORIDE 1000 MG: 500 TABLET ORAL at 17:42

## 2022-08-12 NOTE — PROGRESS NOTES
Pt had bowel movement in chair in tv area. This tech and nurse extern took pt to shower room to clean up. This tech asked pt if she knew she had to go to the bathroom. Pt stated \"yes\". When asked why she didn't go to the bathroom, pt stated \"why should I go to the bathroom when I can just go where I'm at\". Pt just stood in shower and didn't wash self or dry self. Pt didn't participate in her cleaning.

## 2022-08-12 NOTE — PROGRESS NOTES
BEHAVIORAL HEALTH FOLLOW-UP NOTE     2022     Patient was seen and examined in person, Chart reviewed   Patient's case discussed with staff/team    Chief Complaint: Sleeping in recliner in day room    Interim History:     Observed sleeping in her recliner in the day room. She refuses to sleep in her room at night because she is afraid someone is going to come in and hurt her. She has been endorsing significant paranoia and staff report they have witnessed her interacting with unseen others on the unit and talking about cats and puppies on the unit that are not there. She scored 14 out of 30 on her MoCA which indicates significant cognitive decline. She seems to be slowing down significantly since taking the Trileptal and the Risperdal.  We will continue to monitor for response to treatment and ensure she is not too drowsy from the medication. However considering that she was not sleeping for a few days I would expect this patient to be tired once the treatment takes effect, sleep is necessary to resolve preet and psychosis. Appetite:   [x] Normal/Unchanged  [] Increased  [] Decreased      Sleep:       [x] Normal/Unchanged  [] Fair       [] Poor              Energy:    [] Normal/Unchanged  [] Increased  [x] Decreased        SI [] Present  [x] Absent    HI  []Present  [x] Absent     Aggression:  [] yes  [x] no    Patient is [x] able  [] unable to CONTRACT FOR SAFETY     PAST MEDICAL/PSYCHIATRIC HISTORY:   Past Medical History:   Diagnosis Date    CAD (coronary artery disease)     Cataract (lens) fragments in eye following cataract surgery     Diabetes mellitus ([address]     Hyperlipidemia     Hypertension     Pancreatitis        FAMILY/SOCIAL HISTORY:  Family History   Problem Relation Age of Onset    Heart Disease Father          age 71    Heart Disease Mother         age 80      Social History     Socioeconomic History    Marital status:       Spouse name: Not on file    Number of hydroxide-simethicone (MAALOX) 200-200-20 MG/5ML suspension 30 mL, 30 mL, Oral, PRN, Kev Delgado MD    hydrOXYzine HCl (ATARAX) tablet 50 mg, 50 mg, Oral, TID PRN, Kev Delgado MD    haloperidol (HALDOL) tablet 3 mg, 3 mg, Oral, Q6H PRN, 3 mg at 08/11/22 0837 **OR** haloperidol lactate (HALDOL) injection 3 mg, 3 mg, IntraMUSCular, Q6H PRN, Kev Delgado MD    insulin lispro (HUMALOG) injection vial 0-16 Units, 0-16 Units, SubCUTAneous, TID , Eduardo See MD    insulin lispro (HUMALOG) injection vial 0-4 Units, 0-4 Units, SubCUTAneous, Nightly, Eduardo See MD    glucose chewable tablet 16 g, 4 tablet, Oral, PRN, Eduardo See MD    dextrose bolus 10% 125 mL, 125 mL, IntraVENous, PRN **OR** dextrose bolus 10% 250 mL, 250 mL, IntraVENous, PRN, Eduardo See MD    glucagon (rDNA) injection 1 mg, 1 mg, SubCUTAneous, PRN, Eduardo See MD    dextrose 10 % infusion, , IntraVENous, Continuous PRN, Eduardo See MD    atenolol (TENORMIN) tablet 50 mg, 50 mg, Oral, Daily, Eduardo See MD, 50 mg at 08/12/22 1024    atorvastatin (LIPITOR) tablet 40 mg, 40 mg, Oral, Daily, Eduardo See MD, 40 mg at 08/12/22 1024    clopidogrel (PLAVIX) tablet 75 mg, 75 mg, Oral, Daily, Eduardo See MD, 75 mg at 08/12/22 1024    lipase-protease-amylase (CREON) delayed release capsule 72,000 Units, 72,000 Units, Oral, TID Eduardo LUGO MD    metFORMIN (GLUCOPHAGE) tablet 1,000 mg, 1,000 mg, Oral, BID , Eduardo See MD, 1,000 mg at 08/12/22 1024    pantoprazole (PROTONIX) tablet 40 mg, 40 mg, Oral, QAM AC, Eduardo See MD, 40 mg at 08/12/22 0408      Examination:  /61   Pulse 85   Temp 99.1 °F (37.3 °C) (Oral)   Resp 16   Ht 5' 1\" (1.549 m)   Wt 160 lb (72.6 kg)   SpO2 (!) 89%   BMI 30.23 kg/m²   Gait - steady  Medication side effects(SE): none reported     Mental Status Examination:    Level of consciousness:  within normal limits   Appearance:  hospital attire  Behavior/Motor:  agitated and hyperactive  Attitude toward examiner:  attentive  Speech:  spontaneous, rapid, loud, hyperverbal, pressured, and interrupting   Mood: angry and labile  Affect:  mood congruent  Thought processes:  rapid, overabundance of ideas, flight of ideas, illogical, and loose associations   Thought content: Paranoid, delusional with auditory and visual hallucinations patient has been observed interacting with unseen others. Devoid of suicidal or homicidal ideation intent or plan  Cognition:  oriented to person, place, and time   Concentration distractible  Memory intact  Insight poor   Judgement poor   Fund of Knowledge adequate       ASSESSMENT:   Patient symptoms are:  [] Well controlled  [] Improving  [] Worsening  [x] No change      Diagnosis:   Principal Problem:    Severe manic bipolar 1 disorder with psychotic behavior ([facility] [address]  Active Problems:    Cannabis abuse  Resolved Problems:    * No resolved hospital problems.  *      LABS:    Recent Labs     08/10/22  0454   WBC 9.9   HGB 13.0        Recent Labs     08/10/22  0454 08/10/22  1116 08/11/22  0613     --  140   K 3.5  --  3.1*     --  103   CO2 27  --  28   BUN 9  --  13   CREATININE 0.8  --  0.8   GLUCOSE 111* 44 98     Recent Labs     08/10/22  0454   BILITOT 0.7   ALKPHOS 91   AST 50*   ALT 22     Lab Results   Component Value Date/Time    LABAMPH NOT DETECTED 08/10/2022 08:06 AM    BARBSCNU NOT DETECTED 08/10/2022 08:06 AM    LABBENZ POSITIVE 08/10/2022 08:06 AM    LABMETH NOT DETECTED 08/10/2022 08:06 AM    OPIATESCREENURINE NOT DETECTED 08/10/2022 08:06 AM    PHENCYCLIDINESCREENURINE NOT DETECTED 08/10/2022 08:06 AM    ETOH <10 08/10/2022 04:54 AM     Lab Results   Component Value Date/Time    TSH 3.200 07/30/2015 01:12 PM     No results found for: LITHIUM  No results found for: VALPROATE, CBMZ        Treatment Plan:  The patient's diagnosis, treatment plan, medication management were formulated after patient was seen directly by the attending physician and myself and all relevant documentation was reviewed. Risk, benefit, side effects, possible outcomes of the medication and alternatives discussed with the patient and the patient demonstrated understanding. The patient was also educated that the outcome of treatment will depend on the medication compliance as directed by the prescribers along with regular follow-up, compliance with the labs and other work-up, as clinically indicated. Risperdal 0.5 mg twice daily we will optimize this medication as clinically indicated for psychosis  Trileptal 300 mg twice daily as this patient has liver and pancreatic issues she is not able to have depakote for mood stabilization. Continued on home medications as clinically indicated  Melatonin 6 mg daily at 1800   BMP on Sunday to monitor sodium level  UA repeat tomorrow morning    MoCA reveals score of 14 out of 30 indicating significant cognitive impairment highly suspicious of dementia with psychosis on top of bipolar disorder. Collateral information: Followed by social work  CD evaluation  Encourage patient to attend group and other milieu activities. Discharge planning discussed with the patient and treatment team.    PSYCHOTHERAPY/COUNSELING:  [x] Therapeutic interview  [x] Supportive  [] CBT  [] Ongoing  [] Other    [x] Patient continues to need, on a daily basis, active treatment furnished directly by or requiring the supervision of inpatient psychiatric personnel      Anticipated Length of stay: 5 to 7 days based on stability    NOTE: This report was transcribed using voice recognition software. Every effort was made to ensure accuracy; however, inadvertent computerized transcription errors may be present.        Electronically signed by PETTY Roche CNP on 8/12/2022 at 1:52 PM

## 2022-08-12 NOTE — PROGRESS NOTES
Pt's son is home from [place] and is requesting to visit with his mom before returning to [place]. Explained visitation rules to sister and restrictions on visitors on the unit.

## 2022-08-12 NOTE — PROGRESS NOTES
Department of Internal David Ruff M.D. Progress Note      SUBJECTIVE: She has been on Xanax for greater than 10 years and has been stable with it is citalopram has been on for 2 years and she had a clinical response. Today she answers in one-word answers slightly hostile after I woke her up.   She seems to have slowed down her thinking process after she took the respite all and Trileptal    OBJECTIVE pulse is regular and no swelling in legs,    Medications    Current Facility-Administered Medications: potassium chloride (KLOR-CON M) extended release tablet 20 mEq, 20 mEq, Oral, BID WC  risperiDONE (RISPERDAL M-TABS) disintegrating tablet 0.5 mg, 0.5 mg, Oral, BID  OXcarbazepine (TRILEPTAL) tablet 300 mg, 300 mg, Oral, BID  melatonin tablet 6 mg, 6 mg, Oral, Daily  chlordiazePOXIDE (LIBRIUM) capsule 10 mg, 10 mg, Oral, BID PRN  acetaminophen (TYLENOL) tablet 650 mg, 650 mg, Oral, Q4H PRN  magnesium hydroxide (MILK OF MAGNESIA) 400 MG/5ML suspension 30 mL, 30 mL, Oral, Daily PRN  aluminum & magnesium hydroxide-simethicone (MAALOX) 200-200-20 MG/5ML suspension 30 mL, 30 mL, Oral, PRN  hydrOXYzine HCl (ATARAX) tablet 50 mg, 50 mg, Oral, TID PRN  haloperidol (HALDOL) tablet 3 mg, 3 mg, Oral, Q6H PRN **OR** haloperidol lactate (HALDOL) injection 3 mg, 3 mg, IntraMUSCular, Q6H PRN  insulin lispro (HUMALOG) injection vial 0-16 Units, 0-16 Units, SubCUTAneous, TID WC  insulin lispro (HUMALOG) injection vial 0-4 Units, 0-4 Units, SubCUTAneous, Nightly  glucose chewable tablet 16 g, 4 tablet, Oral, PRN  dextrose bolus 10% 125 mL, 125 mL, IntraVENous, PRN **OR** dextrose bolus 10% 250 mL, 250 mL, IntraVENous, PRN  glucagon (rDNA) injection 1 mg, 1 mg, SubCUTAneous, PRN  dextrose 10 % infusion, , IntraVENous, Continuous PRN  atenolol (TENORMIN) tablet 50 mg, 50 mg, Oral, Daily  atorvastatin (LIPITOR) tablet 40 mg, 40 mg, Oral, Daily  clopidogrel (PLAVIX) tablet 75 mg, 75 mg, Oral, Daily  lipase-protease-amylase (CREON) delayed release capsule 72,000 Units, 72,000 Units, Oral, TID WC  metFORMIN (GLUCOPHAGE) tablet 1,000 mg, 1,000 mg, Oral, BID WC  pantoprazole (PROTONIX) tablet 40 mg, 40 mg, Oral, QAM AC  Physical    VITALS:  /60   Pulse 64   Temp 97.5 °F (36.4 °C) (Temporal)   Resp 18   Ht 5' 1\" (1.549 m)   Wt 160 lb (72.6 kg)   SpO2 95%   BMI 30.23 kg/m²   24HR INTAKE/OUTPUT:  No intake or output data in the 24 hours ending 08/12/22 0829    Data    CBC with Differential:    Lab Results   Component Value Date/Time    WBC 9.9 08/10/2022 04:54 AM    RBC 4.98 08/10/2022 04:54 AM    HGB 13.0 08/10/2022 04:54 AM    HCT 42.3 08/10/2022 04:54 AM     08/10/2022 04:54 AM    MCV 84.9 08/10/2022 04:54 AM    MCH 26.1 08/10/2022 04:54 AM    MCHC 30.7 08/10/2022 04:54 AM    RDW 17.2 08/10/2022 04:54 AM    SEGSPCT 76 12/08/2013 06:56 AM    LYMPHOPCT 12.7 08/10/2022 04:54 AM    MONOPCT 7.1 08/10/2022 04:54 AM    BASOPCT 0.4 08/10/2022 04:54 AM    MONOSABS 0.70 08/10/2022 04:54 AM    LYMPHSABS 1.25 08/10/2022 04:54 AM    EOSABS 0.00 08/10/2022 04:54 AM    BASOSABS 0.04 08/10/2022 04:54 AM     CMP:    Lab Results   Component Value Date/Time     08/11/2022 06:13 AM    K 3.1 08/11/2022 06:13 AM     08/11/2022 06:13 AM    CO2 28 08/11/2022 06:13 AM    BUN 13 08/11/2022 06:13 AM    CREATININE 0.8 08/11/2022 06:13 AM    GFRAA >60 08/11/2022 06:13 AM    LABGLOM >60 08/11/2022 06:13 AM    GLUCOSE 98 08/11/2022 06:13 AM    PROT 7.1 08/10/2022 04:54 AM    LABALBU 3.6 08/10/2022 04:54 AM    CALCIUM 9.0 08/11/2022 06:13 AM    BILITOT 0.7 08/10/2022 04:54 AM    ALKPHOS 91 08/10/2022 04:54 AM    AST 50 08/10/2022 04:54 AM    ALT 22 08/10/2022 04:54 AM     PT/INR:    Lab Results   Component Value Date/Time    PROTIME 11.1 05/31/2022 03:44 PM    INR 1.0 05/31/2022 03:44 PM       ASSESSMENT AND PLAN      Principal Problem:    Severe manic bipolar 1 disorder with psychotic behavior ([facility] [address]  Plan: Improved after taking Risperdal and Trileptal  Cannabis usage, wearing off  Diabetes mellitus under control due to lack of calories  Hypertension we will see if it is a problem if she does not take her medicine  Coronary artery disease  Benzodiazepine withdrawal as well as SSRI withdrawal        Electronically signed by Magalis Gunderson MD on 8/12/2022 at 8:29 AM

## 2022-08-12 NOTE — PLAN OF CARE
Problem: Anxiety  Goal: Will report anxiety at manageable levels  Description: INTERVENTIONS:  1. Administer medication as ordered  2. Teach and rehearse alternative coping skills  3. Provide emotional support with 1:1 interaction with staff  Outcome: Progressing     Problem: Confusion  Goal: Confusion, delirium, dementia, or psychosis is improved or at baseline  Description: INTERVENTIONS:  1. Assess for possible contributors to thought disturbance, including medications, impaired vision or hearing, underlying metabolic abnormalities, dehydration, psychiatric diagnoses, and notify attending LIP  2. [place] high risk fall precautions, as indicated  3. Provide frequent short contacts to provide reality reorientation, refocusing and direction  4. Decrease environmental stimuli, including noise as appropriate  5. Monitor and intervene to maintain adequate nutrition, hydration, elimination, sleep and activity  6. If unable to ensure safety without constant attention obtain sitter and review sitter guidelines with assigned personnel  7. Initiate Psychosocial CNS and Spiritual Care consult, as indicated  Outcome: Progressing     Problem: Behavior  Goal: Pt/Family maintain appropriate behavior and adhere to behavioral management agreement, if implemented  Description: INTERVENTIONS:  1. Assess patient/family's coping skills and  non-compliant behavior (including use of illegal substances)  2. Notify security of behavior or suspected illegal substances which indicate the need for search of the family and/or belongings  3. Encourage verbalization of thoughts and concerns in a socially appropriate manner  4. Utilize positive, consistent limit setting strategies supporting safety of patient, staff and others  5. Encourage participation in the decision making process about the behavioral management agreement  6. If a visitor's behavior poses a threat to safety call refer to organization policy.   7. Initiate consult with , Psychosocial CNS, Spiritual Care as appropriate  Outcome: Progressing     Pt is very drowsy this morning. Pt is easily aroused and is alert when awake but has been sleeping most of the shift so far. She did take her medications and did get up and ate lunch at the kitchen table. Unable to assess fully r/t drowsiness but will continue to monitor and provide emotional support.

## 2022-08-12 NOTE — PLAN OF CARE
Problem: Safety - Adult  Goal: Free from fall injury  Outcome: Progressing     Problem: Confusion  Goal: Confusion, delirium, dementia, or psychosis is improved or at baseline  Description: INTERVENTIONS:  1. Assess for possible contributors to thought disturbance, including medications, impaired vision or hearing, underlying metabolic abnormalities, dehydration, psychiatric diagnoses, and notify attending LIP  2. [place] high risk fall precautions, as indicated  3. Provide frequent short contacts to provide reality reorientation, refocusing and direction  4. Decrease environmental stimuli, including noise as appropriate  5. Monitor and intervene to maintain adequate nutrition, hydration, elimination, sleep and activity  6. If unable to ensure safety without constant attention obtain sitter and review sitter guidelines with assigned personnel  7. Initiate Psychosocial CNS and Spiritual Care consult, as indicated  8/11/2022 2135 by Lisa Castelan RN  Outcome: Not Progressing     Problem: Confusion  Goal: Confusion, delirium, dementia, or psychosis is improved or at baseline  Description: INTERVENTIONS:  1. Assess for possible contributors to thought disturbance, including medications, impaired vision or hearing, underlying metabolic abnormalities, dehydration, psychiatric diagnoses, and notify attending LIP  2. [place] high risk fall precautions, as indicated  3. Provide frequent short contacts to provide reality reorientation, refocusing and direction  4. Decrease environmental stimuli, including noise as appropriate  5. Monitor and intervene to maintain adequate nutrition, hydration, elimination, sleep and activity  6. If unable to ensure safety without constant attention obtain sitter and review sitter guidelines with assigned personnel  7.  Initiate Psychosocial CNS and Spiritual Care consult, as indicated  8/11/2022 2135 by Lisa Castelan RN  Outcome: Not Progressing  8/11/2022 1418 by Gigi Jeffery RN  Outcome: Progressing    Patient is in day area, currently talking on the telephone with son, stating that she wants to leave. Patient is irritable, paranoid and suspicious, telling her son that she is not going to sleep tonight because Lyle Coon will suffocate me\". Pt denies SI/HI, anxiety and depression. Pt did grudgingly take scheduled HS medication, but refused prn Vistaril offered for anxiety. Pt continues to sit in chair in day area, quiet at this time. Will continue to monitor. Problem: Confusion  Goal: Confusion, delirium, dementia, or psychosis is improved or at baseline  Description: INTERVENTIONS:  1. Assess for possible contributors to thought disturbance, including medications, impaired vision or hearing, underlying metabolic abnormalities, dehydration, psychiatric diagnoses, and notify attending LIP  2. [place] high risk fall precautions, as indicated  3. Provide frequent short contacts to provide reality reorientation, refocusing and direction  4. Decrease environmental stimuli, including noise as appropriate  5. Monitor and intervene to maintain adequate nutrition, hydration, elimination, sleep and activity  6. If unable to ensure safety without constant attention obtain sitter and review sitter guidelines with assigned personnel  7.  Initiate Psychosocial CNS and Spiritual Care consult, as indicated  8/11/2022 2135 by Naye Harvey RN  Outcome: Not Progressing     Problem: Safety - Adult  Goal: Free from fall injury  Outcome: Progressing

## 2022-08-12 NOTE — PROGRESS NOTES
Patient resting in chair in milieu, quiet to self at this time, respirations are even and unlabored, no signs or symptoms of distress or discomfort. No PRN medications given thus far this shift. Staff will continue to conduct environmental rounds to ensure the safety of everyone on the unit. Staff will provide support and interventions as requested or required.

## 2022-08-12 NOTE — PROGRESS NOTES
BEHAVIORAL HEALTH FOLLOW-UP NOTE     2022     Patient was seen and examined in person, Chart reviewed   Patient's case discussed with staff/team    Chief Complaint:      Interim History:         Appetite:   [x] Normal/Unchanged  [] Increased  [] Decreased      Sleep:       [x] Normal/Unchanged  [] Fair       [] Poor              Energy:    [] Normal/Unchanged  [] Increased  [x] Decreased        SI [] Present  [x] Absent    HI  []Present  [x] Absent     Aggression:  [] yes  [x] no    Patient is [x] able  [] unable to CONTRACT FOR SAFETY     PAST MEDICAL/PSYCHIATRIC HISTORY:   Past Medical History:   Diagnosis Date    CAD (coronary artery disease)     Cataract (lens) fragments in eye following cataract surgery     Diabetes mellitus ([facility] [place] [address]     Hyperlipidemia     Hypertension     Pancreatitis        FAMILY/SOCIAL HISTORY:  Family History   Problem Relation Age of Onset    Heart Disease Father          age 71    Heart Disease Mother         age 80      Social History     Socioeconomic History    Marital status:      Spouse name: Not on file    Number of children: Not on file    Years of education: Not on file    Highest education level: Not on file   Occupational History    Not on file   Tobacco Use    Smoking status: Former     Types: Cigarettes    Smokeless tobacco: Never   Vaping Use    Vaping Use: Never used   Substance and Sexual Activity    Alcohol use: No    Drug use: No    Sexual activity: Not on file   Other Topics Concern    Not on file   Social History Narrative    Not on file     Social Determinants of Health     Financial Resource Strain: Not on file   Food Insecurity: Not on file   Transportation Needs: Not on file   Physical Activity: Not on file   Stress: Not on file   Social Connections: Not on file   Intimate Partner Violence: Not on file   Housing Stability: Not on file           ROS:  [x] All negative/unchanged except if checked.  Explain positive(checked items) below:  [] Constitutional  [] Eyes  [] Ear/Nose/Mouth/Throat  [] Respiratory  [] CV  [] GI  []   [] Musculoskeletal  [] Skin/Breast  [] Neurological  [] Endocrine  [] Heme/Lymph  [] Allergic/Immunologic    Explanation:     MEDICATIONS:    Current Facility-Administered Medications:     potassium chloride (KLOR-CON M) extended release tablet 20 mEq, 20 mEq, Oral, BID , Eduardo See MD, 20 mEq at 08/12/22 1024    risperiDONE (RISPERDAL M-TABS) disintegrating tablet 0.5 mg, 0.5 mg, Oral, BID, Aniceto Moon APRN - CNP, 0.5 mg at 08/12/22 1025    OXcarbazepine (TRILEPTAL) tablet 300 mg, 300 mg, Oral, BID, Aniceto Red, APRN - CNP, 300 mg at 08/12/22 1025    melatonin tablet 6 mg, 6 mg, Oral, Daily, Aniceto Red, APRN - CNP, 6 mg at 08/11/22 1759    chlordiazePOXIDE (LIBRIUM) capsule 10 mg, 10 mg, Oral, BID PRN, Aniceto Red, APRN - CNP    acetaminophen (TYLENOL) tablet 650 mg, 650 mg, Oral, Q4H PRN, Kev Delgado MD    magnesium hydroxide (MILK OF MAGNESIA) 400 MG/5ML suspension 30 mL, 30 mL, Oral, Daily PRN, Kev Delgado MD    aluminum & magnesium hydroxide-simethicone (MAALOX) 200-200-20 MG/5ML suspension 30 mL, 30 mL, Oral, PRN, Kev Delgado MD    hydrOXYzine HCl (ATARAX) tablet 50 mg, 50 mg, Oral, TID PRN, Kev Delgado MD    haloperidol (HALDOL) tablet 3 mg, 3 mg, Oral, Q6H PRN, 3 mg at 08/11/22 0837 **OR** haloperidol lactate (HALDOL) injection 3 mg, 3 mg, IntraMUSCular, Q6H PRN, Kev Delgado MD    insulin lispro (HUMALOG) injection vial 0-16 Units, 0-16 Units, SubCUTAneous, TID Eduardo LUGO MD    insulin lispro (HUMALOG) injection vial 0-4 Units, 0-4 Units, SubCUTAneous, Nightly, Eduardo See MD    glucose chewable tablet 16 g, 4 tablet, Oral, PRN, Eduardo See MD    dextrose bolus 10% 125 mL, 125 mL, IntraVENous, PRN **OR** dextrose bolus 10% 250 mL, 250 mL, IntraVENous, PRN, Eduardo See MD    glucagon (rDNA) injection 1 mg, 1 mg, SubCUTAneous, PRN, Colby Chapin MD    dextrose 10 % infusion, , IntraVENous, Continuous PRN, Colby Chapin MD    atenolol (TENORMIN) tablet 50 mg, 50 mg, Oral, Daily, Colby Chapin MD, 50 mg at 08/12/22 1024    atorvastatin (LIPITOR) tablet 40 mg, 40 mg, Oral, Daily, Colby Chapin MD, 40 mg at 08/12/22 1024    clopidogrel (PLAVIX) tablet 75 mg, 75 mg, Oral, Daily, Colby Chapin MD, 75 mg at 08/12/22 1024    lipase-protease-amylase (CREON) delayed release capsule 72,000 Units, 72,000 Units, Oral, TID Colby LUGO MD    metFORMIN (GLUCOPHAGE) tablet 1,000 mg, 1,000 mg, Oral, BID WC, Colby Chapin MD, 1,000 mg at 08/12/22 1024    pantoprazole (PROTONIX) tablet 40 mg, 40 mg, Oral, QAM , Colby Chapin MD, 40 mg at 08/12/22 1595      Examination:  /61   Pulse 85   Temp 99.1 °F (37.3 °C) (Oral)   Resp 16   Ht 5' 1\" (1.549 m)   Wt 160 lb (72.6 kg)   SpO2 (!) 89%   BMI 30.23 kg/m²   Gait - steady  Medication side effects(SE): none reported     Mental Status Examination:    Level of consciousness:  within normal limits   Appearance:  hospital attire  Behavior/Motor:  agitated and hyperactive  Attitude toward examiner:  attentive  Speech:  spontaneous, rapid, loud, hyperverbal, pressured, and interrupting   Mood: angry and labile  Affect:  mood congruent  Thought processes:  rapid, overabundance of ideas, flight of ideas, illogical, and loose associations   Thought content: Paranoid, delusional with auditory and visual hallucinations patient has been observed interacting with unseen others.   Devoid of suicidal or homicidal ideation intent or plan  Cognition:  oriented to person, place, and time   Concentration distractible  Memory intact  Insight poor   Judgement poor   Fund of Knowledge adequate       ASSESSMENT:   Patient symptoms are:  [] Well controlled  [] Improving  [] Worsening  [x] No change      Diagnosis:   Principal Problem:    Severe manic bipolar 1 disorder with psychotic behavior ([facility] [address]  Active Problems:    Cannabis abuse  Resolved Problems:    * No resolved hospital problems. *      LABS:    Recent Labs     08/10/22  0454   WBC 9.9   HGB 13.0        Recent Labs     08/10/22  0454 08/10/22  1116 08/11/22  0613     --  140   K 3.5  --  3.1*     --  103   CO2 27  --  28   BUN 9  --  13   CREATININE 0.8  --  0.8   GLUCOSE 111* 44 98     Recent Labs     08/10/22  0454   BILITOT 0.7   ALKPHOS 91   AST 50*   ALT 22     Lab Results   Component Value Date/Time    LABAMPH NOT DETECTED 08/10/2022 08:06 AM    BARBSCNU NOT DETECTED 08/10/2022 08:06 AM    LABBENZ POSITIVE 08/10/2022 08:06 AM    LABMETH NOT DETECTED 08/10/2022 08:06 AM    OPIATESCREENURINE NOT DETECTED 08/10/2022 08:06 AM    PHENCYCLIDINESCREENURINE NOT DETECTED 08/10/2022 08:06 AM    ETOH <10 08/10/2022 04:54 AM     Lab Results   Component Value Date/Time    TSH 3.200 07/30/2015 01:12 PM     No results found for: LITHIUM  No results found for: VALPROATE, CBMZ        Treatment Plan:  The patient's diagnosis, treatment plan, medication management were formulated after patient was seen directly by the attending physician and myself and all relevant documentation was reviewed. Risk, benefit, side effects, possible outcomes of the medication and alternatives discussed with the patient and the patient demonstrated understanding. The patient was also educated that the outcome of treatment will depend on the medication compliance as directed by the prescribers along with regular follow-up, compliance with the labs and other work-up, as clinically indicated. Risperdal 0.5 mg twice daily we will optimize this medication as clinically indicated for psychosis  Trileptal 300 mg twice daily as this patient has liver and pancreatic issues she is not able to have depakote for mood stabilization.   Continued on home medications as clinically indicated  Melatonin 6 mg daily at 1800   BMP on Sunday to monitor sodium level  UA repeat tomorrow morning    MoCA reveals score of 14 out of 30 indicating significant cognitive impairment highly suspicious of dementia with psychosis on top of bipolar disorder. Collateral information: Followed by social work  CD evaluation  Encourage patient to attend group and other milieu activities. Discharge planning discussed with the patient and treatment team.    PSYCHOTHERAPY/COUNSELING:  [x] Therapeutic interview  [x] Supportive  [] CBT  [] Ongoing  [] Other    [x] Patient continues to need, on a daily basis, active treatment furnished directly by or requiring the supervision of inpatient psychiatric personnel      Anticipated Length of stay: 5 to 7 days based on stability    NOTE: This report was transcribed using voice recognition software. Every effort was made to ensure accuracy; however, inadvertent computerized transcription errors may be present.        Electronically signed by EPTTY Kirkland CNP on 8/12/2022 at 2:04 PM

## 2022-08-13 LAB
METER GLUCOSE: 146 MG/DL (ref 74–99)
METER GLUCOSE: 185 MG/DL (ref 74–99)
METER GLUCOSE: 207 MG/DL (ref 74–99)
METER GLUCOSE: 87 MG/DL (ref 74–99)
METER GLUCOSE: >500 MG/DL (ref 74–99)

## 2022-08-13 PROCEDURE — 6370000000 HC RX 637 (ALT 250 FOR IP): Performed by: PSYCHIATRY & NEUROLOGY

## 2022-08-13 PROCEDURE — 99232 SBSQ HOSP IP/OBS MODERATE 35: CPT | Performed by: NURSE PRACTITIONER

## 2022-08-13 PROCEDURE — 6370000000 HC RX 637 (ALT 250 FOR IP): Performed by: INTERNAL MEDICINE

## 2022-08-13 PROCEDURE — 6370000000 HC RX 637 (ALT 250 FOR IP): Performed by: NURSE PRACTITIONER

## 2022-08-13 PROCEDURE — 82962 GLUCOSE BLOOD TEST: CPT

## 2022-08-13 PROCEDURE — 1240000000 HC EMOTIONAL WELLNESS R&B

## 2022-08-13 RX ADMIN — ATENOLOL 50 MG: 50 TABLET ORAL at 09:56

## 2022-08-13 RX ADMIN — OXCARBAZEPINE 300 MG: 300 TABLET, FILM COATED ORAL at 20:59

## 2022-08-13 RX ADMIN — OXCARBAZEPINE 300 MG: 300 TABLET, FILM COATED ORAL at 09:59

## 2022-08-13 RX ADMIN — CLOPIDOGREL BISULFATE 75 MG: 75 TABLET, FILM COATED ORAL at 10:00

## 2022-08-13 RX ADMIN — RISPERIDONE 0.5 MG: 0.5 TABLET, ORALLY DISINTEGRATING ORAL at 10:00

## 2022-08-13 RX ADMIN — RISPERIDONE 0.5 MG: 0.5 TABLET, ORALLY DISINTEGRATING ORAL at 20:59

## 2022-08-13 RX ADMIN — POTASSIUM CHLORIDE 20 MEQ: 20 TABLET, EXTENDED RELEASE ORAL at 10:00

## 2022-08-13 RX ADMIN — LOPERAMIDE HYDROCHLORIDE 2 MG: 2 CAPSULE ORAL at 13:17

## 2022-08-13 RX ADMIN — METFORMIN HYDROCHLORIDE 1000 MG: 500 TABLET ORAL at 10:00

## 2022-08-13 RX ADMIN — METFORMIN HYDROCHLORIDE 1000 MG: 500 TABLET ORAL at 18:22

## 2022-08-13 RX ADMIN — POTASSIUM CHLORIDE 20 MEQ: 20 TABLET, EXTENDED RELEASE ORAL at 18:23

## 2022-08-13 RX ADMIN — PANCRELIPASE 72000 UNITS: 36000; 180000; 114000 CAPSULE, DELAYED RELEASE PELLETS ORAL at 13:14

## 2022-08-13 RX ADMIN — MELATONIN 3 MG ORAL TABLET 6 MG: 3 TABLET ORAL at 19:00

## 2022-08-13 RX ADMIN — ATORVASTATIN CALCIUM 40 MG: 40 TABLET, FILM COATED ORAL at 10:00

## 2022-08-13 RX ADMIN — PANCRELIPASE 72000 UNITS: 36000; 180000; 114000 CAPSULE, DELAYED RELEASE PELLETS ORAL at 18:22

## 2022-08-13 RX ADMIN — PANCRELIPASE 72000 UNITS: 36000; 180000; 114000 CAPSULE, DELAYED RELEASE PELLETS ORAL at 09:58

## 2022-08-13 RX ADMIN — HYDROXYZINE HYDROCHLORIDE 50 MG: 10 TABLET ORAL at 20:59

## 2022-08-13 RX ADMIN — ACETAMINOPHEN 650 MG: 325 TABLET, FILM COATED ORAL at 09:59

## 2022-08-13 RX ADMIN — PANTOPRAZOLE SODIUM 40 MG: 40 TABLET, DELAYED RELEASE ORAL at 06:28

## 2022-08-13 ASSESSMENT — PAIN DESCRIPTION - LOCATION: LOCATION: NECK

## 2022-08-13 NOTE — PROGRESS NOTES
Attended afternoon recreation activity. Patient physically in chair for group however did sleep for most of it. Patient 1 of 7 in attendance.

## 2022-08-13 NOTE — PROGRESS NOTES
Patient denies SI/HI/Hallucinations, anxiety and depression. Patient pleasantly confused, alert to self only. Patient told this RN we were in [facility]. Patient follows verbal redirection. Patient observed out on the unit, resting in the recliner a majority of the shift so far. Patient spoke with family and her  on the phone today. Patient socializing with peers while eating meals. Patient taking prescribed medications and eating provided meals without issue.

## 2022-08-13 NOTE — PROGRESS NOTES
BEHAVIORAL HEALTH FOLLOW-UP NOTE     2022     Patient was seen and examined in person, Chart reviewed   Patient's case discussed with staff/team    Chief Complaint: \"I am doing okay. \"  Interim History:   Patient up on the unit states she is doing [place]. \"  She denies SI/HI intent or plan denies auditory or visual hallucinations she is been refusing to attend groups. She is medication compliant no behavioral disturbances noted. Has some underlying irritability on assessment today. She has been reportedly sleeping out in the day area during the day and at night. In a recliner      Appetite:   [x] Normal/Unchanged  [] Increased  [] Decreased      Sleep:       [x] Normal/Unchanged  [] Fair       [] Poor              Energy:    [] Normal/Unchanged  [] Increased  [x] Decreased        SI [] Present  [x] Absent    HI  []Present  [x] Absent     Aggression:  [] yes  [x] no    Patient is [x] able  [] unable to CONTRACT FOR SAFETY     PAST MEDICAL/PSYCHIATRIC HISTORY:   Past Medical History:   Diagnosis Date    CAD (coronary artery disease)     Cataract (lens) fragments in eye following cataract surgery     Diabetes mellitus ([facility] [address]     Hyperlipidemia     Hypertension     Pancreatitis        FAMILY/SOCIAL HISTORY:  Family History   Problem Relation Age of Onset    Heart Disease Father          age 71    Heart Disease Mother         age 80      Social History     Socioeconomic History    Marital status:       Spouse name: Not on file    Number of children: Not on file    Years of education: Not on file    Highest education level: Not on file   Occupational History    Not on file   Tobacco Use    Smoking status: Former     Types: Cigarettes    Smokeless tobacco: Never   Vaping Use    Vaping Use: Never used   Substance and Sexual Activity    Alcohol use: No    Drug use: No    Sexual activity: Not on file   Other Topics Concern    Not on file   Social History Narrative    Not on file     Social injection 3 mg, 3 mg, IntraMUSCular, Q6H PRN, Fior Archuleta MD    insulin lispro (HUMALOG) injection vial 0-16 Units, 0-16 Units, SubCUTAneous, TID Mira LUGO MD    insulin lispro (HUMALOG) injection vial 0-4 Units, 0-4 Units, SubCUTAneous, Nightly, Mira Piña MD    glucose chewable tablet 16 g, 4 tablet, Oral, PRN, Mira Piña MD    dextrose bolus 10% 125 mL, 125 mL, IntraVENous, PRN **OR** dextrose bolus 10% 250 mL, 250 mL, IntraVENous, PRN, Mira Piña MD    glucagon (rDNA) injection 1 mg, 1 mg, SubCUTAneous, PRN, Mira Piña MD    dextrose 10 % infusion, , IntraVENous, Continuous PRN, Mira Piña MD    atenolol (TENORMIN) tablet 50 mg, 50 mg, Oral, Daily, Mira Piña MD, 50 mg at 08/13/22 0956    atorvastatin (LIPITOR) tablet 40 mg, 40 mg, Oral, Daily, Mira Piña MD, 40 mg at 08/13/22 1000    clopidogrel (PLAVIX) tablet 75 mg, 75 mg, Oral, Daily, Mira Piña MD, 75 mg at 08/13/22 1000    lipase-protease-amylase (CREON) delayed release capsule 72,000 Units, 72,000 Units, Oral, TID Mira MD, 72,000 Units at 08/13/22 0958    metFORMIN (GLUCOPHAGE) tablet 1,000 mg, 1,000 mg, Oral, BID Mira MD, 1,000 mg at 08/13/22 1000    pantoprazole (PROTONIX) tablet 40 mg, 40 mg, Oral, QAM AC, Mira Piña MD, 40 mg at 08/13/22 8255      Examination:  /60   Pulse 77   Temp 98.7 °F (37.1 °C) (Temporal)   Resp 18   Ht 5' 1\" (1.549 m)   Wt 160 lb (72.6 kg)   SpO2 92%   BMI 30.23 kg/m²   Gait - steady  Medication side effects(SE): none reported     Mental Status Examination:    Level of consciousness:  within normal limits   Appearance:  hospital attire  Behavior/Motor:  agitated and hyperactive  Attitude toward examiner:  attentive  Speech:  spontaneous, rapid, loud, hyperverbal, pressured, and interrupting   Mood: angry and labile  Affect:  mood congruent  Thought processes:  rapid, overabundance of ideas, flight of ideas, illogical, and loose associations   Thought content: Paranoid, delusional with auditory and visual hallucinations patient has been observed interacting with unseen others. Devoid of suicidal or homicidal ideation intent or plan  Cognition:  oriented to person, place, and time   Concentration distractible  Memory intact  Insight poor   Judgement poor   Fund of Knowledge adequate       ASSESSMENT:   Patient symptoms are:  [] Well controlled  [] Improving  [] Worsening  [x] No change      Diagnosis:   Principal Problem:    Severe manic bipolar 1 disorder with psychotic behavior ([facility] [address]  Active Problems:    Cannabis abuse  Resolved Problems:    * No resolved hospital problems. *      LABS:    No results for input(s): WBC, HGB, PLT in the last 72 hours. Recent Labs     08/10/22  1116 08/11/22  0613   NA  --  140   K  --  3.1*   CL  --  103   CO2  --  28   BUN  --  13   CREATININE  --  0.8   GLUCOSE 44 98     No results for input(s): BILITOT, ALKPHOS, AST, ALT in the last 72 hours. Lab Results   Component Value Date/Time    LABAMPH NOT DETECTED 08/10/2022 08:06 AM    BARBSCNU NOT DETECTED 08/10/2022 08:06 AM    LABBENZ POSITIVE 08/10/2022 08:06 AM    LABMETH NOT DETECTED 08/10/2022 08:06 AM    OPIATESCREENURINE NOT DETECTED 08/10/2022 08:06 AM    PHENCYCLIDINESCREENURINE NOT DETECTED 08/10/2022 08:06 AM    ETOH <10 08/10/2022 04:54 AM     Lab Results   Component Value Date/Time    TSH 3.200 07/30/2015 01:12 PM     No results found for: LITHIUM  No results found for: VALPROATE, CBMZ        Treatment Plan:  The patient's diagnosis, treatment plan, medication management were formulated after patient was seen directly by the attending physician and myself and all relevant documentation was reviewed. Risk, benefit, side effects, possible outcomes of the medication and alternatives discussed with the patient and the patient demonstrated understanding.   The patient was also educated that the outcome of treatment will depend on the medication compliance as directed by the prescribers along with regular follow-up, compliance with the labs and other work-up, as clinically indicated. Risperdal 0.5 mg twice daily we will optimize this medication as clinically indicated for psychosis  Trileptal 300 mg twice daily as this patient has liver and pancreatic issues she is not able to have depakote for mood stabilization. Continued on home medications as clinically indicated  Melatonin 6 mg daily at 1800   BMP on Sunday to monitor sodium level  UA repeat tomorrow morning    MoCA reveals score of 14 out of 30 indicating significant cognitive impairment highly suspicious of dementia with psychosis on top of bipolar disorder. Collateral information: Followed by social work  CD evaluation  Encourage patient to attend group and other milieu activities. Discharge planning discussed with the patient and treatment team.    PSYCHOTHERAPY/COUNSELING:  [x] Therapeutic interview  [x] Supportive  [] CBT  [] Ongoing  [] Other    [x] Patient continues to need, on a daily basis, active treatment furnished directly by or requiring the supervision of inpatient psychiatric personnel      Anticipated Length of stay: 5 to 7 days based on stability    NOTE: This report was transcribed using voice recognition software. Every effort was made to ensure accuracy; however, inadvertent computerized transcription errors may be present.        Electronically signed by PETTY Sam CNP on 1/79/3704 at 10:44 AM

## 2022-08-13 NOTE — PROGRESS NOTES
Patient A+Ox 4, denies SI, HI, and internal stimulation. Patient rates both anxiety and depression a 5/10. Patient is calm, pleasant, and cooperative with staff. She states to feel sad because she's not able to go home. Patient demonstrates good eye contact when communicating. Patient is compliant with the medication regimen. No behaviors, patient observed watching television out on the unit and later eating a snack. Patient will continue to work toward discharge goals which includes medication compliance and group participation. Staff will continue to offer support and interventions per request and as required.

## 2022-08-13 NOTE — PLAN OF CARE
Problem: Chronic Conditions and Co-morbidities  Goal: Patient's chronic conditions and co-morbidity symptoms are monitored and maintained or improved  Outcome: Progressing     Problem: Safety - Adult  Goal: Free from fall injury  Outcome: Progressing     Problem: Anxiety  Goal: Will report anxiety at manageable levels  Description: INTERVENTIONS:  1. Administer medication as ordered  2. Teach and rehearse alternative coping skills  3. Provide emotional support with 1:1 interaction with staff  Outcome: Progressing     Problem: Confusion  Goal: Confusion, delirium, dementia, or psychosis is improved or at baseline  Description: INTERVENTIONS:  1. Assess for possible contributors to thought disturbance, including medications, impaired vision or hearing, underlying metabolic abnormalities, dehydration, psychiatric diagnoses, and notify attending LIP  2. [place] high risk fall precautions, as indicated  3. Provide frequent short contacts to provide reality reorientation, refocusing and direction  4. Decrease environmental stimuli, including noise as appropriate  5. Monitor and intervene to maintain adequate nutrition, hydration, elimination, sleep and activity  6. If unable to ensure safety without constant attention obtain sitter and review sitter guidelines with assigned personnel  7. Initiate Psychosocial CNS and Spiritual Care consult, as indicated  Outcome: Progressing     Problem: Drug Abuse/Detox  Goal: Will have no detox symptoms and will verbalize plan for changing drug-related behavior  Description: INTERVENTIONS:  1. Administer medication as ordered  2. Monitor physical status  3. Provide emotional support with 1:1 interaction with staff  4. Encourage  recovery focused treatment   Outcome: Progressing     Problem: Involuntary Admit  Goal: Will cooperate with staff recommendations and doctor's orders and will demonstrate appropriate behavior  Description: INTERVENTIONS:  1.  Treat underlying conditions and offer medication as ordered  2. Educate regarding involuntary admission procedures and rules  3.  Contain excessive/inappropriate behavior per unit and hospital policies  Outcome: Progressing     Problem: ABCDS Injury Assessment  Goal: Absence of physical injury  Outcome: Progressing

## 2022-08-13 NOTE — PROGRESS NOTES
Department of Internal Danny Winston M.D.  Progress Note      SUBJECTIVE: Spent the night in the day room and was incontinent again reemphasized sleeping in bed would be more comfortable    OBJECTIVE answers questions and inmore abrupt hostile manner; she seems to be more oriented    Medications    Current Facility-Administered Medications: loperamide (IMODIUM) capsule 2 mg, 2 mg, Oral, 4x Daily PRN  potassium chloride (KLOR-CON M) extended release tablet 20 mEq, 20 mEq, Oral, BID WC  risperiDONE (RISPERDAL M-TABS) disintegrating tablet 0.5 mg, 0.5 mg, Oral, BID  OXcarbazepine (TRILEPTAL) tablet 300 mg, 300 mg, Oral, BID  melatonin tablet 6 mg, 6 mg, Oral, Daily  chlordiazePOXIDE (LIBRIUM) capsule 10 mg, 10 mg, Oral, BID PRN  acetaminophen (TYLENOL) tablet 650 mg, 650 mg, Oral, Q4H PRN  magnesium hydroxide (MILK OF MAGNESIA) 400 MG/5ML suspension 30 mL, 30 mL, Oral, Daily PRN  aluminum & magnesium hydroxide-simethicone (MAALOX) 200-200-20 MG/5ML suspension 30 mL, 30 mL, Oral, PRN  hydrOXYzine HCl (ATARAX) tablet 50 mg, 50 mg, Oral, TID PRN  haloperidol (HALDOL) tablet 3 mg, 3 mg, Oral, Q6H PRN **OR** haloperidol lactate (HALDOL) injection 3 mg, 3 mg, IntraMUSCular, Q6H PRN  insulin lispro (HUMALOG) injection vial 0-16 Units, 0-16 Units, SubCUTAneous, TID WC  insulin lispro (HUMALOG) injection vial 0-4 Units, 0-4 Units, SubCUTAneous, Nightly  glucose chewable tablet 16 g, 4 tablet, Oral, PRN  dextrose bolus 10% 125 mL, 125 mL, IntraVENous, PRN **OR** dextrose bolus 10% 250 mL, 250 mL, IntraVENous, PRN  glucagon (rDNA) injection 1 mg, 1 mg, SubCUTAneous, PRN  dextrose 10 % infusion, , IntraVENous, Continuous PRN  atenolol (TENORMIN) tablet 50 mg, 50 mg, Oral, Daily  atorvastatin (LIPITOR) tablet 40 mg, 40 mg, Oral, Daily  clopidogrel (PLAVIX) tablet 75 mg, 75 mg, Oral, Daily  lipase-protease-amylase (CREON) delayed release capsule 72,000 Units, 72,000 Units, Oral, TID   metFORMIN 8:54 AM

## 2022-08-13 NOTE — PLAN OF CARE
Problem: Chronic Conditions and Co-morbidities  Goal: Patient's chronic conditions and co-morbidity symptoms are monitored and maintained or improved  Outcome: Progressing     Problem: Anxiety  Goal: Will report anxiety at manageable levels  Description: INTERVENTIONS:  1. Administer medication as ordered  2. Teach and rehearse alternative coping skills  3. Provide emotional support with 1:1 interaction with staff  8/12/2022 2109 by Regina Genao RN  Outcome: Progressing  8/12/2022 1353 by Loren Valencia RN  Outcome: Progressing     Problem: Confusion  Goal: Confusion, delirium, dementia, or psychosis is improved or at baseline  Description: INTERVENTIONS:  1. Assess for possible contributors to thought disturbance, including medications, impaired vision or hearing, underlying metabolic abnormalities, dehydration, psychiatric diagnoses, and notify attending LIP  2. [place] high risk fall precautions, as indicated  3. Provide frequent short contacts to provide reality reorientation, refocusing and direction  4. Decrease environmental stimuli, including noise as appropriate  5. Monitor and intervene to maintain adequate nutrition, hydration, elimination, sleep and activity  6. If unable to ensure safety without constant attention obtain sitter and review sitter guidelines with assigned personnel  7.  Initiate Psychosocial CNS and Spiritual Care consult, as indicated  8/12/2022 2109 by Regina Genao RN  Outcome: Progressing  8/12/2022 1353 by Loren Valencia RN  Outcome: Progressing

## 2022-08-13 NOTE — CARE COORDINATION
Sw encouraged pt to attend cognitive skills group today. Pt denied wanting to attend cognitive skills group today.

## 2022-08-13 NOTE — PROGRESS NOTES
Unable to obtain urine specimen so far this shift, patient had x2 episodes of urine incontinence. Patient told this RN that \"you don't need my pee. \" Patient had x1 episode of diarrhea and treated with PRN Imodium (see MAR). Buttocks red and blanchable. Patient provided waffle cushion and barrier cream applied to buttocks. Patient provided water and encouraged to increase fluid intake. Will attempt to collect specimen at a later time.

## 2022-08-13 NOTE — PROGRESS NOTES
No behaviors observed during night shift, no prn's administered at this time. No signs/symptoms of distress or discomfort noted. Patient sitting up in chair at team station Will continue to monitor and support.  Q 15 minute observation checks maintained

## 2022-08-14 LAB
ANION GAP SERPL CALCULATED.3IONS-SCNC: 12 MMOL/L (ref 7–16)
BACTERIA: ABNORMAL /HPF
BILIRUBIN URINE: NEGATIVE
BLOOD, URINE: NEGATIVE
BUN BLDV-MCNC: 12 MG/DL (ref 6–23)
CALCIUM SERPL-MCNC: 9.6 MG/DL (ref 8.6–10.2)
CHLORIDE BLD-SCNC: 101 MMOL/L (ref 98–107)
CLARITY: CLEAR
CO2: 25 MMOL/L (ref 22–29)
COLOR: YELLOW
CREAT SERPL-MCNC: 0.7 MG/DL (ref 0.5–1)
GFR AFRICAN AMERICAN: >60
GFR NON-AFRICAN AMERICAN: >60 ML/MIN/1.73
GLUCOSE BLD-MCNC: 97 MG/DL (ref 74–99)
GLUCOSE URINE: >=1000 MG/DL
KETONES, URINE: NEGATIVE MG/DL
LEUKOCYTE ESTERASE, URINE: ABNORMAL
METER GLUCOSE: 104 MG/DL (ref 74–99)
METER GLUCOSE: 116 MG/DL (ref 74–99)
METER GLUCOSE: 160 MG/DL (ref 74–99)
NITRITE, URINE: NEGATIVE
PH UA: 6 (ref 5–9)
POTASSIUM SERPL-SCNC: 4.1 MMOL/L (ref 3.5–5)
PROTEIN UA: NEGATIVE MG/DL
RBC UA: ABNORMAL /HPF (ref 0–2)
SODIUM BLD-SCNC: 138 MMOL/L (ref 132–146)
SPECIFIC GRAVITY UA: 1.02 (ref 1–1.03)
UROBILINOGEN, URINE: 0.2 E.U./DL
WBC UA: ABNORMAL /HPF (ref 0–5)

## 2022-08-14 PROCEDURE — 82962 GLUCOSE BLOOD TEST: CPT

## 2022-08-14 PROCEDURE — 99232 SBSQ HOSP IP/OBS MODERATE 35: CPT | Performed by: NURSE PRACTITIONER

## 2022-08-14 PROCEDURE — S5553 INSULIN LONG ACTING 5 U: HCPCS | Performed by: INTERNAL MEDICINE

## 2022-08-14 PROCEDURE — 6370000000 HC RX 637 (ALT 250 FOR IP): Performed by: INTERNAL MEDICINE

## 2022-08-14 PROCEDURE — 80048 BASIC METABOLIC PNL TOTAL CA: CPT

## 2022-08-14 PROCEDURE — 6370000000 HC RX 637 (ALT 250 FOR IP): Performed by: NURSE PRACTITIONER

## 2022-08-14 PROCEDURE — 36415 COLL VENOUS BLD VENIPUNCTURE: CPT

## 2022-08-14 PROCEDURE — 1240000000 HC EMOTIONAL WELLNESS R&B

## 2022-08-14 PROCEDURE — 87088 URINE BACTERIA CULTURE: CPT

## 2022-08-14 PROCEDURE — 81001 URINALYSIS AUTO W/SCOPE: CPT

## 2022-08-14 RX ORDER — INSULIN GLARGINE-YFGN 100 [IU]/ML
15 INJECTION, SOLUTION SUBCUTANEOUS DAILY
Status: DISCONTINUED | OUTPATIENT
Start: 2022-08-14 | End: 2022-08-16 | Stop reason: HOSPADM

## 2022-08-14 RX ADMIN — OXCARBAZEPINE 300 MG: 300 TABLET, FILM COATED ORAL at 09:05

## 2022-08-14 RX ADMIN — RISPERIDONE 0.5 MG: 0.5 TABLET, ORALLY DISINTEGRATING ORAL at 09:05

## 2022-08-14 RX ADMIN — INSULIN GLARGINE-YFGN 15 UNITS: 100 INJECTION, SOLUTION SUBCUTANEOUS at 10:01

## 2022-08-14 RX ADMIN — PANCRELIPASE 72000 UNITS: 36000; 180000; 114000 CAPSULE, DELAYED RELEASE PELLETS ORAL at 09:04

## 2022-08-14 RX ADMIN — METFORMIN HYDROCHLORIDE 1000 MG: 500 TABLET ORAL at 09:04

## 2022-08-14 RX ADMIN — OXCARBAZEPINE 300 MG: 300 TABLET, FILM COATED ORAL at 20:28

## 2022-08-14 RX ADMIN — PANCRELIPASE 72000 UNITS: 36000; 180000; 114000 CAPSULE, DELAYED RELEASE PELLETS ORAL at 17:07

## 2022-08-14 RX ADMIN — ATORVASTATIN CALCIUM 40 MG: 40 TABLET, FILM COATED ORAL at 09:06

## 2022-08-14 RX ADMIN — METFORMIN HYDROCHLORIDE 1000 MG: 500 TABLET ORAL at 16:59

## 2022-08-14 RX ADMIN — PETROLATUM: 420 OINTMENT TOPICAL at 18:56

## 2022-08-14 RX ADMIN — MELATONIN 3 MG ORAL TABLET 6 MG: 3 TABLET ORAL at 16:59

## 2022-08-14 RX ADMIN — LOPERAMIDE HYDROCHLORIDE 2 MG: 2 CAPSULE ORAL at 13:08

## 2022-08-14 RX ADMIN — POTASSIUM CHLORIDE 20 MEQ: 20 TABLET, EXTENDED RELEASE ORAL at 16:58

## 2022-08-14 RX ADMIN — CLOPIDOGREL BISULFATE 75 MG: 75 TABLET, FILM COATED ORAL at 09:05

## 2022-08-14 RX ADMIN — POTASSIUM CHLORIDE 20 MEQ: 20 TABLET, EXTENDED RELEASE ORAL at 09:04

## 2022-08-14 RX ADMIN — PANTOPRAZOLE SODIUM 40 MG: 40 TABLET, DELAYED RELEASE ORAL at 06:31

## 2022-08-14 RX ADMIN — RISPERIDONE 0.5 MG: 0.5 TABLET, ORALLY DISINTEGRATING ORAL at 20:28

## 2022-08-14 RX ADMIN — PANCRELIPASE 72000 UNITS: 36000; 180000; 114000 CAPSULE, DELAYED RELEASE PELLETS ORAL at 12:21

## 2022-08-14 RX ADMIN — ATENOLOL 50 MG: 50 TABLET ORAL at 09:05

## 2022-08-14 ASSESSMENT — LIFESTYLE VARIABLES: HOW OFTEN DO YOU HAVE A DRINK CONTAINING ALCOHOL: NEVER

## 2022-08-14 NOTE — PLAN OF CARE
Problem: Chronic Conditions and Co-morbidities  Goal: Patient's chronic conditions and co-morbidity symptoms are monitored and maintained or improved  Outcome: Progressing     Problem: Anxiety  Goal: Will report anxiety at manageable levels  Description: INTERVENTIONS:  1. Administer medication as ordered  2. Teach and rehearse alternative coping skills  3. Provide emotional support with 1:1 interaction with staff  Outcome: Progressing     Problem: Decision Making  Goal: Pt/Family able to effectively weigh alternatives and participate in decision making related to treatment and care  Description: INTERVENTIONS:  1. Determine when there are differences between patient's view, family's view, and healthcare provider's view of condition  2. Facilitate patient and family articulation of goals for care  3. Help patient and family identify pros/cons of alternative solutions  4. Provide information as requested by patient/family  5. Respect patient/family right to receive or not to receive information  6. Serve as a liaison between patient and family and health care team  7. Initiate Consults from Ethics, Palliative Care or initiate [address] as is appropriate  Outcome: Progressing     Problem: Drug Abuse/Detox  Goal: Will have no detox symptoms and will verbalize plan for changing drug-related behavior  Description: INTERVENTIONS:  1. Administer medication as ordered  2. Monitor physical status  3. Provide emotional support with 1:1 interaction with staff  4. Encourage  recovery focused treatment   Outcome: Progressing     Problem: Involuntary Admit  Goal: Will cooperate with staff recommendations and doctor's orders and will demonstrate appropriate behavior  Description: INTERVENTIONS:  1. Treat underlying conditions and offer medication as ordered  2. Educate regarding involuntary admission procedures and rules  3.  Contain excessive/inappropriate behavior per unit and hospital policies  Outcome: Progressing     Problem: ABCDS Injury Assessment  Goal: Absence of physical injury  Outcome: Progressing

## 2022-08-14 NOTE — PROGRESS NOTES
Department of Internal Yazmin Zamora M.D.  Progress Note      SUBJECTIVE: I asked her to go to the room and sleep so that she could be closer to the bathroom she admits to eating much more and I pointed out her glucoses shot up above 500 which could contribute to her urinary incontinence she seems to be responding to the medication and I wonder if we could cut back on therisperdol    OBJECTIVE abdomen is soft nontender,  She responds in sentences today and seems to be appropriate with the questions    Medications    Current Facility-Administered Medications: insulin glargine-yfgn (SEMGLEE-YFGN) injection vial 15 Units, 15 Units, SubCUTAneous, Daily  loperamide (IMODIUM) capsule 2 mg, 2 mg, Oral, 4x Daily PRN  potassium chloride (KLOR-CON M) extended release tablet 20 mEq, 20 mEq, Oral, BID WC  risperiDONE (RISPERDAL M-TABS) disintegrating tablet 0.5 mg, 0.5 mg, Oral, BID  OXcarbazepine (TRILEPTAL) tablet 300 mg, 300 mg, Oral, BID  melatonin tablet 6 mg, 6 mg, Oral, Daily  chlordiazePOXIDE (LIBRIUM) capsule 10 mg, 10 mg, Oral, BID PRN  acetaminophen (TYLENOL) tablet 650 mg, 650 mg, Oral, Q4H PRN  magnesium hydroxide (MILK OF MAGNESIA) 400 MG/5ML suspension 30 mL, 30 mL, Oral, Daily PRN  aluminum & magnesium hydroxide-simethicone (MAALOX) 200-200-20 MG/5ML suspension 30 mL, 30 mL, Oral, PRN  hydrOXYzine HCl (ATARAX) tablet 50 mg, 50 mg, Oral, TID PRN  haloperidol (HALDOL) tablet 3 mg, 3 mg, Oral, Q6H PRN **OR** haloperidol lactate (HALDOL) injection 3 mg, 3 mg, IntraMUSCular, Q6H PRN  insulin lispro (HUMALOG) injection vial 0-16 Units, 0-16 Units, SubCUTAneous, TID WC  insulin lispro (HUMALOG) injection vial 0-4 Units, 0-4 Units, SubCUTAneous, Nightly  glucose chewable tablet 16 g, 4 tablet, Oral, PRN  dextrose bolus 10% 125 mL, 125 mL, IntraVENous, PRN **OR** dextrose bolus 10% 250 mL, 250 mL, IntraVENous, PRN  glucagon (rDNA) injection 1 mg, 1 mg, SubCUTAneous, PRN  dextrose 10 % infusion, , IntraVENous, Continuous PRN  atenolol (TENORMIN) tablet 50 mg, 50 mg, Oral, Daily  atorvastatin (LIPITOR) tablet 40 mg, 40 mg, Oral, Daily  clopidogrel (PLAVIX) tablet 75 mg, 75 mg, Oral, Daily  lipase-protease-amylase (CREON) delayed release capsule 72,000 Units, 72,000 Units, Oral, TID WC  metFORMIN (GLUCOPHAGE) tablet 1,000 mg, 1,000 mg, Oral, BID WC  pantoprazole (PROTONIX) tablet 40 mg, 40 mg, Oral, QAM AC  Physical    VITALS:  BP (!) 165/73   Pulse 65   Temp 97.1 °F (36.2 °C) (Temporal)   Resp 16   Ht 5' 1\" (1.549 m)   Wt 160 lb (72.6 kg)   SpO2 94%   BMI 30.23 kg/m²   24HR INTAKE/OUTPUT:  No intake or output data in the 24 hours ending 08/14/22 0835    Data    CBC with Differential:    Lab Results   Component Value Date/Time    WBC 9.9 08/10/2022 04:54 AM    RBC 4.98 08/10/2022 04:54 AM    HGB 13.0 08/10/2022 04:54 AM    HCT 42.3 08/10/2022 04:54 AM     08/10/2022 04:54 AM    MCV 84.9 08/10/2022 04:54 AM    MCH 26.1 08/10/2022 04:54 AM    MCHC 30.7 08/10/2022 04:54 AM    RDW 17.2 08/10/2022 04:54 AM    SEGSPCT 76 12/08/2013 06:56 AM    LYMPHOPCT 12.7 08/10/2022 04:54 AM    MONOPCT 7.1 08/10/2022 04:54 AM    BASOPCT 0.4 08/10/2022 04:54 AM    MONOSABS 0.70 08/10/2022 04:54 AM    LYMPHSABS 1.25 08/10/2022 04:54 AM    EOSABS 0.00 08/10/2022 04:54 AM    BASOSABS 0.04 08/10/2022 04:54 AM     CMP:    Lab Results   Component Value Date/Time     08/11/2022 06:13 AM    K 3.1 08/11/2022 06:13 AM     08/11/2022 06:13 AM    CO2 28 08/11/2022 06:13 AM    BUN 13 08/11/2022 06:13 AM    CREATININE 0.8 08/11/2022 06:13 AM    GFRAA >60 08/11/2022 06:13 AM    LABGLOM >60 08/11/2022 06:13 AM    GLUCOSE 98 08/11/2022 06:13 AM    PROT 7.1 08/10/2022 04:54 AM    LABALBU 3.6 08/10/2022 04:54 AM    CALCIUM 9.0 08/11/2022 06:13 AM    BILITOT 0.7 08/10/2022 04:54 AM    ALKPHOS 91 08/10/2022 04:54 AM    AST 50 08/10/2022 04:54 AM    ALT 22 08/10/2022 04:54 AM     PT/INR:    Lab Results   Component Value Date/Time    PROTIME 11.1 05/31/2022 03:44 PM    INR 1.0 05/31/2022 03:44 PM       ASSESSMENT AND PLAN    Principal Problem:    Severe manic bipolar 1 disorder [facility]  Plan: Improving    Cognitive dysfunction we will need to test in the next few days  Diabetes mellitus going out of control as she picked up her diet  Hypertension      Electronically signed by Julia Gilmore MD on 8/14/2022 at 8:35 AM

## 2022-08-14 NOTE — PROGRESS NOTES
Patient denies SI/HI/Hallucinations. Patient much more alert and awake. Patient alert and oriented to person, place, and year. Patient pleasant, brightened with conversation. Patient easy to redirect. Patient out on the unit watching television and socializing with select peers. Patient taking prescribed medications, eating provided meals, and participating in group therapy.

## 2022-08-14 NOTE — PROGRESS NOTES
Patient resting quietly in recliner in day area at this time, as she refuses to sleep in her room. Respirations are even and unlabored, with no signs or symptoms of distress or discomfort noted. Staff will continue to conduct environmental rounds to ensure the safety of everyone on the unit. Staff will provide support and interventions as requested or required.

## 2022-08-14 NOTE — PROGRESS NOTES
Patient much more alert today. x2 open areas noted to coccyx and buttocks. Patient intermittently incontinent of diarrhea. Patient educated on the importance of frequent repositioning and toileting Q2H. New orders received for wound care consult and aquaphor. Patient's buttocks cleaned with foam cleanser and aquaphor applied. Patient currently up to the recliner with waffle cushion in place. Wound care notified of consult via [facility].

## 2022-08-14 NOTE — PROGRESS NOTES
Patient attended afternoon recreation group. Patients were able to share what bands he/she likes. Patients able to pick out favorite song and listen. Patient was 1 of 12 in attendance.

## 2022-08-14 NOTE — GROUP NOTE
Group Therapy Note    Date: 8/14/2022    Group Start Time: 4617  Group End Time: 8804  Group Topic: Cognitive Skills    [facility]    MIKO Turner        Group Therapy Note    Attendees: 5       Patient's Goal:  PT will be able to identify healthy and unhealthy coping strategies and identify ways to cope healthily in difficult situations. Note: Pt was active participant in group. Status After Intervention:  Improved    Participation Level: Active Listener and Interactive    Participation Quality: Appropriate, Attentive, Sharing, Supportive, and Lethargic      Speech:  normal      Thought Process/Content: Logical  Linear      Affective Functioning: Congruent      Mood:  lethargic      Level of consciousness:  Alert and Attentive      Response to Learning: Able to verbalize current knowledge/experience, Able to verbalize/acknowledge new learning, Able to retain information, Capable of insight, and Progressing to goal      Endings: None Reported    Modes of Intervention: Education, Support, Socialization, Exploration, Clarifying, and Problem-solving      Discipline Responsible: /Counselor      Signature:   [facility]

## 2022-08-14 NOTE — PROGRESS NOTES
BEHAVIORAL HEALTH FOLLOW-UP NOTE     2022     Patient was seen and examined in person, Chart reviewed   Patient's case discussed with staff/team    Chief Complaint: \"I guess I do not like going to my room. \"  Interim History:   Patient up on the unit lying in the recliner out in the day area she was sleeping when I approached her she wakes up to talk to me denies all symptoms denies SI/HI intent or plan denies auditory or visual hallucinations reportedly told staff last night that she does not like sleeping in her room because she feels as though it is like [facility] I asked her if she is been speaking with her family and she states that she has. She is more appropriate denies SI/HI intent or plan denies auditory or visual hallucinations or thoughts seem clearer today she is more oriented    Appetite:   [x] Normal/Unchanged  [] Increased  [] Decreased      Sleep:       [x] Normal/Unchanged  [] Fair       [] Poor              Energy:    [] Normal/Unchanged  [] Increased  [x] Decreased        SI [] Present  [x] Absent    HI  []Present  [x] Absent     Aggression:  [] yes  [x] no    Patient is [x] able  [] unable to CONTRACT FOR SAFETY     PAST MEDICAL/PSYCHIATRIC HISTORY:   Past Medical History:   Diagnosis Date    CAD (coronary artery disease)     Cataract (lens) fragments in eye following cataract surgery     Diabetes mellitus ([facility]     Hyperlipidemia     Hypertension     Pancreatitis        FAMILY/SOCIAL HISTORY:  Family History   Problem Relation Age of Onset    Heart Disease Father          age 71    Heart Disease Mother         age 80      Social History     Socioeconomic History    Marital status:       Spouse name: Not on file    Number of children: Not on file    Years of education: Not on file    Highest education level: Not on file   Occupational History    Not on file   Tobacco Use    Smoking status: Former     Types: Cigarettes    Smokeless tobacco: Never   Vaping Use    Vaping Use: Never used   Substance and Sexual Activity    Alcohol use: No    Drug use: No    Sexual activity: Not on file   Other Topics Concern    Not on file   Social History Narrative    Not on file     Social Determinants of Health     Financial Resource Strain: Not on file   Food Insecurity: Not on file   Transportation Needs: Not on file   Physical Activity: Not on file   Stress: Not on file   Social Connections: Not on file   Intimate Partner Violence: Not on file   Housing Stability: Not on file           ROS:  [x] All negative/unchanged except if checked.  Explain positive(checked items) below:  [] Constitutional  [] Eyes  [] Ear/Nose/Mouth/Throat  [] Respiratory  [] CV  [] GI  []   [] Musculoskeletal  [] Skin/Breast  [] Neurological  [] Endocrine  [] Heme/Lymph  [] Allergic/Immunologic    Explanation:     MEDICATIONS:    Current Facility-Administered Medications:     insulin glargine-yfgn (SEMGLEE-YFGN) injection vial 15 Units, 15 Units, SubCUTAneous, Daily, Anthony Sanabria MD    loperamide (IMODIUM) capsule 2 mg, 2 mg, Oral, 4x Daily PRN, Narayan Weston, APRN - CNP, 2 mg at 08/13/22 1317    potassium chloride (KLOR-CON M) extended release tablet 20 mEq, 20 mEq, Oral, BID WC, Anthony Sanabria MD, 20 mEq at 08/13/22 1823    risperiDONE (RISPERDAL M-TABS) disintegrating tablet 0.5 mg, 0.5 mg, Oral, BID, Barnetta Ave, APRN - CNP, 0.5 mg at 08/13/22 2059    OXcarbazepine (TRILEPTAL) tablet 300 mg, 300 mg, Oral, BID, [address]etta Ave, APRN - CNP, 300 mg at 08/13/22 2059    melatonin tablet 6 mg, 6 mg, Oral, Daily, [address]etta Ave, APRN - CNP, 6 mg at 08/13/22 1900    chlordiazePOXIDE (LIBRIUM) capsule 10 mg, 10 mg, Oral, BID PRN, Narayan Weston APRN - CNP    acetaminophen (TYLENOL) tablet 650 mg, 650 mg, Oral, Q4H PRN, Vanessa Harris MD, 650 mg at 08/13/22 0959    magnesium hydroxide (MILK OF MAGNESIA) 400 MG/5ML suspension 30 mL, 30 mL, Oral, Daily PRN, Vanessa Harris MD    aluminum & magnesium hydroxide-simethicone (MAALOX) 200-200-20 MG/5ML suspension 30 mL, 30 mL, Oral, PRN, Tre Arnold MD    hydrOXYzine HCl (ATARAX) tablet 50 mg, 50 mg, Oral, TID PRN, Tre Arnold MD, 50 mg at 08/13/22 2059    haloperidol (HALDOL) tablet 3 mg, 3 mg, Oral, Q6H PRN, 3 mg at 08/11/22 0837 **OR** haloperidol lactate (HALDOL) injection 3 mg, 3 mg, IntraMUSCular, Q6H PRN, Tre Arnold MD    insulin lispro (HUMALOG) injection vial 0-16 Units, 0-16 Units, SubCUTAneous, TID WC, Kirby Britton MD    insulin lispro (HUMALOG) injection vial 0-4 Units, 0-4 Units, SubCUTAneous, Nightly, Kirby Britton MD    glucose chewable tablet 16 g, 4 tablet, Oral, PRN, Kirby Britton MD    dextrose bolus 10% 125 mL, 125 mL, IntraVENous, PRN **OR** dextrose bolus 10% 250 mL, 250 mL, IntraVENous, PRN, Kirby Britton MD    glucagon (rDNA) injection 1 mg, 1 mg, SubCUTAneous, PRN, Kirby Britton MD    dextrose 10 % infusion, , IntraVENous, Continuous PRN, Kirby Britton MD    atenolol (TENORMIN) tablet 50 mg, 50 mg, Oral, Daily, Kirby Britton MD, 50 mg at 08/13/22 0956    atorvastatin (LIPITOR) tablet 40 mg, 40 mg, Oral, Daily, Kirby Britton MD, 40 mg at 08/13/22 1000    clopidogrel (PLAVIX) tablet 75 mg, 75 mg, Oral, Daily, Kirby Britton MD, 75 mg at 08/13/22 1000    lipase-protease-amylase (CREON) delayed release capsule 72,000 Units, 72,000 Units, Oral, TID Kirby LUGO MD, 72,000 Units at 08/13/22 1822    metFORMIN (GLUCOPHAGE) tablet 1,000 mg, 1,000 mg, Oral, BID Kirby MD, 1,000 mg at 08/13/22 1822    pantoprazole (PROTONIX) tablet 40 mg, 40 mg, Oral, QAM , Kirby Britton MD, 40 mg at 08/14/22 0631      Examination:  BP (!) 165/73   Pulse 65   Temp 97.1 °F (36.2 °C) (Temporal)   Resp 16   Ht 5' 1\" (1.549 m)   Wt 160 lb (72.6 kg)   SpO2 94%   BMI 30.23 kg/m²   Gait - steady  Medication side effects(SE): none reported     Mental directly by the attending physician and myself and all relevant documentation was reviewed. Risk, benefit, side effects, possible outcomes of the medication and alternatives discussed with the patient and the patient demonstrated understanding. The patient was also educated that the outcome of treatment will depend on the medication compliance as directed by the prescribers along with regular follow-up, compliance with the labs and other work-up, as clinically indicated. Risperdal 0.5 mg twice daily we will optimize this medication as clinically indicated for psychosis  Trileptal 300 mg twice daily as this patient has liver and pancreatic issues she is not able to have depakote for mood stabilization. Continued on home medications as clinically indicated  Melatonin 6 mg daily at 1800   BMP on Sunday to monitor sodium level  UA repeat tomorrow morning    MoCA reveals score of 14 out of 30 indicating significant cognitive impairment highly suspicious of dementia with psychosis on top of bipolar disorder. Collateral information: Followed by social work  CD evaluation  Encourage patient to attend group and other milieu activities. Discharge planning discussed with the patient and treatment team.    PSYCHOTHERAPY/COUNSELING:  [x] Therapeutic interview  [x] Supportive  [] CBT  [] Ongoing  [] Other    [x] Patient continues to need, on a daily basis, active treatment furnished directly by or requiring the supervision of inpatient psychiatric personnel      Anticipated Length of stay: 5 to 7 days based on stability    NOTE: This report was transcribed using voice recognition software. Every effort was made to ensure accuracy; however, inadvertent computerized transcription errors may be present.        Electronically signed by PETTY Scherer CNP on 0/43/4273 at 8:42 AM

## 2022-08-15 LAB
ANION GAP SERPL CALCULATED.3IONS-SCNC: 10 MMOL/L (ref 7–16)
BUN BLDV-MCNC: 12 MG/DL (ref 6–23)
CALCIUM SERPL-MCNC: 8.9 MG/DL (ref 8.6–10.2)
CHLORIDE BLD-SCNC: 107 MMOL/L (ref 98–107)
CO2: 27 MMOL/L (ref 22–29)
CREAT SERPL-MCNC: 0.7 MG/DL (ref 0.5–1)
GFR AFRICAN AMERICAN: >60
GFR NON-AFRICAN AMERICAN: >60 ML/MIN/1.73
GLUCOSE BLD-MCNC: 93 MG/DL (ref 74–99)
METER GLUCOSE: 102 MG/DL (ref 74–99)
METER GLUCOSE: 147 MG/DL (ref 74–99)
METER GLUCOSE: 157 MG/DL (ref 74–99)
METER GLUCOSE: 89 MG/DL (ref 74–99)
METER GLUCOSE: 89 MG/DL (ref 74–99)
POTASSIUM SERPL-SCNC: 4 MMOL/L (ref 3.5–5)
SODIUM BLD-SCNC: 144 MMOL/L (ref 132–146)
URINE CULTURE, ROUTINE: NORMAL

## 2022-08-15 PROCEDURE — 6370000000 HC RX 637 (ALT 250 FOR IP): Performed by: NURSE PRACTITIONER

## 2022-08-15 PROCEDURE — 1240000000 HC EMOTIONAL WELLNESS R&B

## 2022-08-15 PROCEDURE — 82962 GLUCOSE BLOOD TEST: CPT

## 2022-08-15 PROCEDURE — 6370000000 HC RX 637 (ALT 250 FOR IP): Performed by: PSYCHIATRY & NEUROLOGY

## 2022-08-15 PROCEDURE — 80048 BASIC METABOLIC PNL TOTAL CA: CPT

## 2022-08-15 PROCEDURE — 36415 COLL VENOUS BLD VENIPUNCTURE: CPT

## 2022-08-15 PROCEDURE — 6370000000 HC RX 637 (ALT 250 FOR IP): Performed by: INTERNAL MEDICINE

## 2022-08-15 PROCEDURE — 99232 SBSQ HOSP IP/OBS MODERATE 35: CPT | Performed by: NURSE PRACTITIONER

## 2022-08-15 PROCEDURE — S5553 INSULIN LONG ACTING 5 U: HCPCS | Performed by: INTERNAL MEDICINE

## 2022-08-15 RX ADMIN — CLOPIDOGREL BISULFATE 75 MG: 75 TABLET, FILM COATED ORAL at 09:13

## 2022-08-15 RX ADMIN — ANORECTAL OINTMENT: 15.7; .44; 24; 20.6 OINTMENT TOPICAL at 21:08

## 2022-08-15 RX ADMIN — OXCARBAZEPINE 300 MG: 300 TABLET, FILM COATED ORAL at 21:00

## 2022-08-15 RX ADMIN — LOPERAMIDE HYDROCHLORIDE 2 MG: 2 CAPSULE ORAL at 21:00

## 2022-08-15 RX ADMIN — RISPERIDONE 0.5 MG: 0.5 TABLET, ORALLY DISINTEGRATING ORAL at 21:00

## 2022-08-15 RX ADMIN — PANTOPRAZOLE SODIUM 40 MG: 40 TABLET, DELAYED RELEASE ORAL at 06:27

## 2022-08-15 RX ADMIN — PETROLATUM: 420 OINTMENT TOPICAL at 09:20

## 2022-08-15 RX ADMIN — POTASSIUM CHLORIDE 20 MEQ: 20 TABLET, EXTENDED RELEASE ORAL at 17:46

## 2022-08-15 RX ADMIN — HYDROXYZINE HYDROCHLORIDE 50 MG: 10 TABLET ORAL at 21:00

## 2022-08-15 RX ADMIN — OXCARBAZEPINE 300 MG: 300 TABLET, FILM COATED ORAL at 09:12

## 2022-08-15 RX ADMIN — PANCRELIPASE 72000 UNITS: 36000; 180000; 114000 CAPSULE, DELAYED RELEASE PELLETS ORAL at 13:28

## 2022-08-15 RX ADMIN — ATORVASTATIN CALCIUM 40 MG: 40 TABLET, FILM COATED ORAL at 09:12

## 2022-08-15 RX ADMIN — INSULIN GLARGINE-YFGN 15 UNITS: 100 INJECTION, SOLUTION SUBCUTANEOUS at 09:09

## 2022-08-15 RX ADMIN — ATENOLOL 50 MG: 50 TABLET ORAL at 09:16

## 2022-08-15 RX ADMIN — METFORMIN HYDROCHLORIDE 1000 MG: 500 TABLET ORAL at 17:46

## 2022-08-15 RX ADMIN — RISPERIDONE 0.5 MG: 0.5 TABLET, ORALLY DISINTEGRATING ORAL at 09:13

## 2022-08-15 RX ADMIN — PANCRELIPASE 72000 UNITS: 36000; 180000; 114000 CAPSULE, DELAYED RELEASE PELLETS ORAL at 09:12

## 2022-08-15 RX ADMIN — LOPERAMIDE HYDROCHLORIDE 2 MG: 2 CAPSULE ORAL at 09:13

## 2022-08-15 RX ADMIN — METFORMIN HYDROCHLORIDE 1000 MG: 500 TABLET ORAL at 09:12

## 2022-08-15 RX ADMIN — PANCRELIPASE 72000 UNITS: 36000; 180000; 114000 CAPSULE, DELAYED RELEASE PELLETS ORAL at 17:46

## 2022-08-15 RX ADMIN — POTASSIUM CHLORIDE 20 MEQ: 20 TABLET, EXTENDED RELEASE ORAL at 09:13

## 2022-08-15 RX ADMIN — MELATONIN 3 MG ORAL TABLET 6 MG: 3 TABLET ORAL at 17:46

## 2022-08-15 NOTE — GROUP NOTE
Group Therapy Note    Date: 8/15/2022    Group Start Time: 1130  Group End Time: 1200  Group Topic: Cognitive Skills    [facility]    MARIA E العراقي LSW        Group Therapy Note    Attendees: 10       Patient's Goal:  Pt will be able to verbalize understanding of grief, and healthy ways to cope. Notes:  Pt made connections and participated in treatment. Status After Intervention:  Improved    Participation Level:  Active Listener and Interactive    Participation Quality: Appropriate, Attentive, and Sharing      Speech:  normal      Thought Process/Content: Flight of ideas      Affective Functioning: Flat      Mood: depressed      Level of consciousness:  Alert and Attentive      Response to Learning: Able to verbalize current knowledge/experience      Endings: None Reported    Modes of Intervention: Education, Support, Socialization, and Exploration      Discipline Responsible: /Counselor      Signature:  MARIA E العراقي LSW

## 2022-08-15 NOTE — PLAN OF CARE
Problem: Chronic Conditions and Co-morbidities  Goal: Patient's chronic conditions and co-morbidity symptoms are monitored and maintained or improved  Outcome: Progressing     Problem: Safety - Adult  Goal: Free from fall injury  8/15/2022 1012 by Mary Ann Garcia RN  Outcome: Progressing  8/14/2022 2128 by Genny Walsh RN  Outcome: Progressing     Problem: Anxiety  Goal: Will report anxiety at manageable levels  Description: INTERVENTIONS:  1. Administer medication as ordered  2. Teach and rehearse alternative coping skills  3. Provide emotional support with 1:1 interaction with staff  8/15/2022 1012 by Mary Ann Garcia RN  Outcome: Progressing  8/14/2022 2128 by Genny Walsh RN  Outcome: Progressing     Problem: Confusion  Goal: Confusion, delirium, dementia, or psychosis is improved or at baseline  Description: INTERVENTIONS:  1. Assess for possible contributors to thought disturbance, including medications, impaired vision or hearing, underlying metabolic abnormalities, dehydration, psychiatric diagnoses, and notify attending LIP  2. [place] high risk fall precautions, as indicated  3. Provide frequent short contacts to provide reality reorientation, refocusing and direction  4. Decrease environmental stimuli, including noise as appropriate  5. Monitor and intervene to maintain adequate nutrition, hydration, elimination, sleep and activity  6. If unable to ensure safety without constant attention obtain sitter and review sitter guidelines with assigned personnel  7. Initiate Psychosocial CNS and Spiritual Care consult, as indicated  Outcome: Progressing     Problem: Behavior  Goal: Pt/Family maintain appropriate behavior and adhere to behavioral management agreement, if implemented  Description: INTERVENTIONS:  1. Assess patient/family's coping skills and  non-compliant behavior (including use of illegal substances)  2.  Notify security of behavior or suspected illegal substances which indicate the need for search of the family and/or belongings  3. Encourage verbalization of thoughts and concerns in a socially appropriate manner  4. Utilize positive, consistent limit setting strategies supporting safety of patient, staff and others  5. Encourage participation in the decision making process about the behavioral management agreement  6. If a visitor's behavior poses a threat to safety call refer to organization policy. 7. Initiate consult with , Psychosocial CNS, Spiritual Care as appropriate  Outcome: Progressing     Problem: Drug Abuse/Detox  Goal: Will have no detox symptoms and will verbalize plan for changing drug-related behavior  Description: INTERVENTIONS:  1. Administer medication as ordered  2. Monitor physical status  3. Provide emotional support with 1:1 interaction with staff  4. Encourage  recovery focused treatment   Outcome: Progressing     Problem: Involuntary Admit  Goal: Will cooperate with staff recommendations and doctor's orders and will demonstrate appropriate behavior  Description: INTERVENTIONS:  1. Treat underlying conditions and offer medication as ordered  2. Educate regarding involuntary admission procedures and rules  3. Contain excessive/inappropriate behavior per unit and hospital policies  8/69/7728 [id number] by Kamini Alvarez RN  Outcome: Progressing  8/14/2022 2128 by Emi Perkins RN  Outcome: Progressing     Problem: ABCDS Injury Assessment  Goal: Absence of physical injury  Outcome: Progressing     Problem: Skin/Tissue Integrity  Goal: Absence of new skin breakdown  Description: 1. Monitor for areas of redness and/or skin breakdown  2. Assess vascular access sites hourly  3. Every 4-6 hours minimum:  Change oxygen saturation probe site  4. Every 4-6 hours:  If on nasal continuous positive airway pressure, respiratory therapy assess nares and determine need for appliance change or resting period.   Outcome: Progressing

## 2022-08-15 NOTE — FLOWSHEET NOTE
Inpatient Wound Care (Initial consult) 7304A    Admit Date: 8/10/2022  4:18 AM    Reason for consult:  Buttocks    Significant history:  Per H&P    HISTORY OF PRESENT ILLNESS:       The patient is a 80 y.o. female with significant past history of diabetes, depression, sleep depravation, and cannabis use, exocrine pancreatic insufficiency with enzyme treatment, arthosclerosis and hx of angioplasties, metabolic syndrome, history of pancreatitis, cholecystectomy. No past inpatient psychiatric hospitalizations, taking celexa for some depressed mood and recently marijuana use along with use of Xanax. She was found naked by police as she was throwing items outside of her home, she was brought to the emergency room via EMS and believed that she was being kidnapped patient had reported she had not slept in several days and she was pink slipped by the police. Per her PCP who was able to see her in hospital, she was fully functional 2 months ago able to meet all of her own needs and maintain her ADLs and personal affairs. On his assessment, she was demonstrating significant paranoia which is a deviation from her baseline, he is suspicious the recent introduction of medical marijuana may have contributed to the alteration in her mentation. Greatly appreciate Dr Catherine Gowers for his collaboration. Patient was medically cleared in the ED, UDS positive for THc and Benzodiazepine. Qtc 467.     Findings:     08/15/22 1430   Skin Integumentary    Skin Integrity Erosion/denuded  (IAD)   Location bilateral buttocks   Skin Integrity Site 2   Skin Integrity Location 2 Ecchymosis   Location 2 BUE   Skin Integrity Site 3   Skin Integrity Location 3   (redness, blanchable)    Location 3 bilateral heels   Wound 08/14/22 Coccyx   Date First Assessed/Time First Assessed: 08/14/22 1349   Present on Hospital Admission: No  Location: Coccyx   Wound Image    Wound Etiology Deep tissue/Injury   Dressing/Treatment Pharmaceutical agent (see MAR)   Wound Length (cm) 1.8 cm   Wound Width (cm) 1.7 cm   Wound Depth (cm) 0.1 cm   Wound Surface Area (cm^2) 3.06 cm^2   Change in Wound Size % (l*w) -104   Wound Volume (cm^3) 0.306 cm^3   Wound Healing % -104   Wound Assessment Purple/maroon;Pink/red   Drainage Amount None   Odor None   Jessica-wound Assessment Blanchable erythema     **Informed Consent**    The patient has given verbal consent to have photos taken of wound and inserted into their chart as part of their permanent medical record for purposes of documentation, treatment management and/or medical review. All Images taken on 8/15/22 of patient name: Lucky Sandhoff were transmitted and stored on secured [facility] by a registered [employer] Mobile Application Device. Impression:  Coccyx: DTI    Plan: Calmoseptine  Seat cushion  Patient will need continued preventative care.       Peggy Bucio RN 8/15/2022 2:36 PM

## 2022-08-15 NOTE — PROGRESS NOTES
Patient denies SI/HI/Hallucinations, anxiety or depression. Patient pleasant and bright, joking and laughing with staff and peers. Patient A&Ox3, much more alert and awake today. Patient forgetful at times, but easy to redirect. Patient ambulating independently without difficulty. Patient encouraged to utilize provided waffle cushion while sitting out on the unit. Patient taking prescribed medications, eating provided meals, and attending groups without issue.

## 2022-08-15 NOTE — CARE COORDINATION
SAMY spoke with pt's daughter, Jessica Tony ([phone]). Jessica Tony states that she was on vacation, and this why she did not answer this 's calls. Jessica Tony states that she received an update from this pt's RN, and was wondering when this pt would be discharged. SW explained that this pt may be discharged tomorrow. Jessica Tony verbalized no concerns. Jessica Tony states that she would like this pt to offered to step down prior to discharge, but if the pt disagrees to stepping down, she can stay with Jessica Tony prior to returning home. SW verbalized understanding. SW will continue to follow.

## 2022-08-15 NOTE — BH NOTE
Patient has been out on the unit, laying in a recliner. Patient denies all and has been pleasant, calm, and cooperative. Patient has been socializing with others. Patient is alert and oriented to person, place, and year. Patient is encouraged to continue to work towards discharge goal by complying with medications, attending groups and to seek staff if feelings are overwhelming. Environmental rounds completed per unit policy to maintain safety of everyone on the unit. Staff will offer support and interventions as requested or required.

## 2022-08-15 NOTE — PLAN OF CARE
Problem: Safety - Adult  Goal: Free from fall injury  Outcome: Progressing     Problem: Anxiety  Goal: Will report anxiety at manageable levels  Description: INTERVENTIONS:  1. Administer medication as ordered  2. Teach and rehearse alternative coping skills  3. Provide emotional support with 1:1 interaction with staff  8/14/2022 2128 by Francy Burris RN  Outcome: Progressing     Problem: Involuntary Admit  Goal: Will cooperate with staff recommendations and doctor's orders and will demonstrate appropriate behavior  Description: INTERVENTIONS:  1. Treat underlying conditions and offer medication as ordered  2. Educate regarding involuntary admission procedures and rules  3.  Contain excessive/inappropriate behavior per unit and hospital policies  2/13/[phone] by Francy Burris RN  Outcome: Progressing

## 2022-08-15 NOTE — CARE COORDINATION
SW met with this pt for a daily check in. Pt observed sitting by herself in the dinning room. Pt states that she is feeling \"great\" today. Pt states that she would like to return home with Mehreen Saavedra at discharge, and does not wish to step-down to the Crisis Unit. Pt is bright and friendly with this . Pt's eye-contact is good. Pt appears mostly linear and logical. Pt is currently denying SI/ HI/ hallucinations/ delusions. Pt's discharge plan is to return home with her daughter Mehreen Saavedra. Collateral from [address] [place] [address] this discharge plan. Pt's daughter will provide transportation. Pt will continue to follow with Dr. Marine Alvarez at discharge. SW will continue to monitor this pt's moods and behaviors.

## 2022-08-15 NOTE — PROGRESS NOTES
BEHAVIORAL HEALTH FOLLOW-UP NOTE     8/15/2022     Patient was seen and examined in person, Chart reviewed   Patient's case discussed with staff/team    Chief Complaint: \"I I am feeling better\"  Interim History:   Patient seen in her room this morning she is out of her recliner. She is alert and oriented to time place and person. She took a shower today no delusions noted. She denies SI/HI intent or plan denies auditory or visual hallucinations she is pleasant on the unit she attended group today        Appetite:   [x] Normal/Unchanged  [] Increased  [] Decreased      Sleep:       [x] Normal/Unchanged  [] Fair       [] Poor              Energy:    [] Normal/Unchanged  [] Increased  [x] Decreased        SI [] Present  [x] Absent    HI  []Present  [x] Absent     Aggression:  [] yes  [x] no    Patient is [x] able  [] unable to CONTRACT FOR SAFETY     PAST MEDICAL/PSYCHIATRIC HISTORY:   Past Medical History:   Diagnosis Date    CAD (coronary artery disease)     Cataract (lens) fragments in eye following cataract surgery     Diabetes mellitus ([facility] [address]     Hyperlipidemia     Hypertension     Pancreatitis        FAMILY/SOCIAL HISTORY:  Family History   Problem Relation Age of Onset    Heart Disease Father          age 71    Heart Disease Mother         age 80      Social History     Socioeconomic History    Marital status:       Spouse name: Not on file    Number of children: Not on file    Years of education: Not on file    Highest education level: Not on file   Occupational History    Not on file   Tobacco Use    Smoking status: Former     Types: Cigarettes    Smokeless tobacco: Never   Vaping Use    Vaping Use: Never used   Substance and Sexual Activity    Alcohol use: No    Drug use: No    Sexual activity: Not on file   Other Topics Concern    Not on file   Social History Narrative    Not on file     Social Determinants of Health     Financial Resource Strain: Not on file   Food Insecurity: Not on file   Transportation Needs: Not on file   Physical Activity: Not on file   Stress: Not on file   Social Connections: Not on file   Intimate Partner Violence: Not on file   Housing Stability: Not on file           ROS:  [x] All negative/unchanged except if checked.  Explain positive(checked items) below:  [] Constitutional  [] Eyes  [] Ear/Nose/Mouth/Throat  [] Respiratory  [] CV  [] GI  []   [] Musculoskeletal  [] Skin/Breast  [] Neurological  [] Endocrine  [] Heme/Lymph  [] Allergic/Immunologic    Explanation:     MEDICATIONS:    Current Facility-Administered Medications:     insulin glargine-yfgn (SEMGLEE-YFGN) injection vial 15 Units, 15 Units, SubCUTAneous, Daily, Imna Pemberton MD, 15 Units at 08/15/22 0909    white petrolatum ointment, , Topical, PRN, PETTY Gregory CNP, Given at 08/15/22 0920    loperamide (IMODIUM) capsule 2 mg, 2 mg, Oral, 4x Daily PRN, PETTY Parsons CNP, 2 mg at 08/15/22 0913    potassium chloride (KLOR-CON M) extended release tablet 20 mEq, 20 mEq, Oral, BID WC, Iman Pemberton MD, 20 mEq at 08/15/22 0913    risperiDONE (RISPERDAL M-TABS) disintegrating tablet 0.5 mg, 0.5 mg, Oral, BID, PETTY Parsons - CNP, 0.5 mg at 08/15/22 0913    OXcarbazepine (TRILEPTAL) tablet 300 mg, 300 mg, Oral, BID, PETTY Parsons CNP, 300 mg at 08/15/22 0912    melatonin tablet 6 mg, 6 mg, Oral, Daily, PETTY Parsons - CNP, 6 mg at 08/14/22 1659    chlordiazePOXIDE (LIBRIUM) capsule 10 mg, 10 mg, Oral, BID PRN, PETTY Parsons CNP    acetaminophen (TYLENOL) tablet 650 mg, 650 mg, Oral, Q4H PRN, Letha Casarez MD, 650 mg at 08/13/22 0959    magnesium hydroxide (MILK OF MAGNESIA) 400 MG/5ML suspension 30 mL, 30 mL, Oral, Daily PRN, Letha Casarez MD    aluminum & magnesium hydroxide-simethicone (MAALOX) 200-200-20 MG/5ML suspension 30 mL, 30 mL, Oral, PRN, Letha Casarez MD    hydrOXYzine HCl (ATARAX) tablet 50 mg, 50 mg, Oral, TID PRN, Marino Saucedo MD Zeenat, 50 mg at 08/13/22 2059    haloperidol (HALDOL) tablet 3 mg, 3 mg, Oral, Q6H PRN, 3 mg at 08/11/22 0837 **OR** haloperidol lactate (HALDOL) injection 3 mg, 3 mg, IntraMUSCular, Q6H PRN, Marylee Buckle, MD    insulin lispro (HUMALOG) injection vial 0-16 Units, 0-16 Units, SubCUTAneous, TID Phil LUGO MD    insulin lispro (HUMALOG) injection vial 0-4 Units, 0-4 Units, SubCUTAneous, Nightly, Phil Burton MD    glucose chewable tablet 16 g, 4 tablet, Oral, PRN, Phil Burton MD    dextrose bolus 10% 125 mL, 125 mL, IntraVENous, PRN **OR** dextrose bolus 10% 250 mL, 250 mL, IntraVENous, PRN, Phil Burton MD    glucagon (rDNA) injection 1 mg, 1 mg, SubCUTAneous, PRN, Phil Burton MD    dextrose 10 % infusion, , IntraVENous, Continuous PRN, Phil Burton MD    atenolol (TENORMIN) tablet 50 mg, 50 mg, Oral, Daily, Phil Burton MD, 50 mg at 08/15/22 0916    atorvastatin (LIPITOR) tablet 40 mg, 40 mg, Oral, Daily, Phil Burton MD, 40 mg at 08/15/22 0912    clopidogrel (PLAVIX) tablet 75 mg, 75 mg, Oral, Daily, Phil Burton MD, 75 mg at 08/15/22 0913    lipase-protease-amylase (CREON) delayed release capsule 72,000 Units, 72,000 Units, Oral, TID Phil LUGO MD, 72,000 Units at 08/15/22 1328    metFORMIN (GLUCOPHAGE) tablet 1,000 mg, 1,000 mg, Oral, BID Phil LUGO MD, 1,000 mg at 08/15/22 0912    pantoprazole (PROTONIX) tablet 40 mg, 40 mg, Oral, QAM , Phil Burton MD, 40 mg at 08/15/22 1723      Examination:  BP (!) 153/70   Pulse 69   Temp 97.8 °F (36.6 °C) (Temporal)   Resp 16   Ht 5' 1\" (1.549 m)   Wt 160 lb (72.6 kg)   SpO2 92%   BMI 30.23 kg/m²   Gait - steady  Medication side effects(SE): none reported     Mental Status Examination:    Level of consciousness:  within normal limits   Appearance:  hospital attire  Behavior/Motor:  agitated and hyperactive  Attitude toward examiner: attentive  Speech:  spontaneous, rapid, loud, hyperverbal, pressured, and interrupting   Mood: angry and labile  Affect:  mood congruent  Thought processes:  rapid, overabundance of ideas, flight of ideas, illogical, and loose associations   Thought content: Paranoid, delusional with auditory and visual hallucinations patient has been observed interacting with unseen others. Devoid of suicidal or homicidal ideation intent or plan  Cognition:  oriented to person, place, and time   Concentration distractible  Memory intact  Insight poor   Judgement poor   Fund of Knowledge adequate       ASSESSMENT:   Patient symptoms are:  [] Well controlled  [] Improving  [] Worsening  [x] No change      Diagnosis:   Principal Problem:    Severe manic bipolar 1 disorder with psychotic behavior ([facility] [address]  Active Problems:    Cannabis abuse  Resolved Problems:    * No resolved hospital problems. *      LABS:    No results for input(s): WBC, HGB, PLT in the last 72 hours. Recent Labs     08/14/22  1315 08/15/22  0730    144   K 4.1 4.0    107   CO2 25 27   BUN 12 12   CREATININE 0.7 0.7   GLUCOSE 97 93       No results for input(s): BILITOT, ALKPHOS, AST, ALT in the last 72 hours. Lab Results   Component Value Date/Time    LABAMPH NOT DETECTED 08/10/2022 08:06 AM    BARBSCNU NOT DETECTED 08/10/2022 08:06 AM    LABBENZ POSITIVE 08/10/2022 08:06 AM    LABMETH NOT DETECTED 08/10/2022 08:06 AM    OPIATESCREENURINE NOT DETECTED 08/10/2022 08:06 AM    PHENCYCLIDINESCREENURINE NOT DETECTED 08/10/2022 08:06 AM    ETOH <10 08/10/2022 04:54 AM     Lab Results   Component Value Date/Time    TSH 3.200 07/30/2015 01:12 PM     No results found for: LITHIUM  No results found for: VALPROATE, CBMZ        Treatment Plan:  The patient's diagnosis, treatment plan, medication management were formulated after patient was seen directly by the attending physician and myself and all relevant documentation was reviewed.     Risk, benefit, side effects, possible outcomes of the medication and alternatives discussed with the patient and the patient demonstrated understanding. The patient was also educated that the outcome of treatment will depend on the medication compliance as directed by the prescribers along with regular follow-up, compliance with the labs and other work-up, as clinically indicated. Risperdal 0.5 mg twice daily we will optimize this medication as clinically indicated for psychosis  Trileptal 300 mg twice daily as this patient has liver and pancreatic issues she is not able to have depakote for mood stabilization. Continued on home medications as clinically indicated  Melatonin 6 mg daily at 1800   BMP on Sunday to monitor sodium level  UA repeat tomorrow morning    MoCA reveals score of 14 out of 30 indicating significant cognitive impairment highly suspicious of dementia with psychosis on top of bipolar disorder. Repeat moca    Collateral information: Followed by social work  CD evaluation  Encourage patient to attend group and other milieu activities. Discharge planning discussed with the patient and treatment team.    PSYCHOTHERAPY/COUNSELING:  [x] Therapeutic interview  [x] Supportive  [] CBT  [] Ongoing  [] Other    [x] Patient continues to need, on a daily basis, active treatment furnished directly by or requiring the supervision of inpatient psychiatric personnel      Anticipated Length of stay: 5 to 7 days based on stability    NOTE: This report was transcribed using voice recognition software. Every effort was made to ensure accuracy; however, inadvertent computerized transcription errors may be present.        Electronically signed by PETTY Carbone CNP on 9/27/3192 at 2:03 PM

## 2022-08-15 NOTE — PROGRESS NOTES
Department of Internal Keith Smith M.D.  Progress Note      SUBJECTIVE: Patient is sleeping in her room and apparently is now continent she reports her diarrhea is once a day and she is not sure if she is getting her enzymes before each meal.  She asks when she can be discharged and I said that things are improving very well    OBJECTIVE abdomen is soft and nontender; she is alert and oriented and says she has trouble remembering she does not think she took the gummy cannabis for a length of time she cannot remember    Medications    Current Facility-Administered Medications: insulin glargine-yfgn (SEMGLEE-YFGN) injection vial 15 Units, 15 Units, SubCUTAneous, Daily  white petrolatum ointment, , Topical, PRN  loperamide (IMODIUM) capsule 2 mg, 2 mg, Oral, 4x Daily PRN  potassium chloride (KLOR-CON M) extended release tablet 20 mEq, 20 mEq, Oral, BID WC  risperiDONE (RISPERDAL M-TABS) disintegrating tablet 0.5 mg, 0.5 mg, Oral, BID  OXcarbazepine (TRILEPTAL) tablet 300 mg, 300 mg, Oral, BID  melatonin tablet 6 mg, 6 mg, Oral, Daily  chlordiazePOXIDE (LIBRIUM) capsule 10 mg, 10 mg, Oral, BID PRN  acetaminophen (TYLENOL) tablet 650 mg, 650 mg, Oral, Q4H PRN  magnesium hydroxide (MILK OF MAGNESIA) 400 MG/5ML suspension 30 mL, 30 mL, Oral, Daily PRN  aluminum & magnesium hydroxide-simethicone (MAALOX) 200-200-20 MG/5ML suspension 30 mL, 30 mL, Oral, PRN  hydrOXYzine HCl (ATARAX) tablet 50 mg, 50 mg, Oral, TID PRN  haloperidol (HALDOL) tablet 3 mg, 3 mg, Oral, Q6H PRN **OR** haloperidol lactate (HALDOL) injection 3 mg, 3 mg, IntraMUSCular, Q6H PRN  insulin lispro (HUMALOG) injection vial 0-16 Units, 0-16 Units, SubCUTAneous, TID WC  insulin lispro (HUMALOG) injection vial 0-4 Units, 0-4 Units, SubCUTAneous, Nightly  glucose chewable tablet 16 g, 4 tablet, Oral, PRN  dextrose bolus 10% 125 mL, 125 mL, IntraVENous, PRN **OR** dextrose bolus 10% 250 mL, 250 mL, IntraVENous, PRN  glucagon (rDNA) injection 1 mg, 1 mg, SubCUTAneous, PRN  dextrose 10 % infusion, , IntraVENous, Continuous PRN  atenolol (TENORMIN) tablet 50 mg, 50 mg, Oral, Daily  atorvastatin (LIPITOR) tablet 40 mg, 40 mg, Oral, Daily  clopidogrel (PLAVIX) tablet 75 mg, 75 mg, Oral, Daily  lipase-protease-amylase (CREON) delayed release capsule 72,000 Units, 72,000 Units, Oral, TID WC  metFORMIN (GLUCOPHAGE) tablet 1,000 mg, 1,000 mg, Oral, BID WC  pantoprazole (PROTONIX) tablet 40 mg, 40 mg, Oral, QAM AC  Physical    VITALS:  BP (!) 153/70   Pulse 69   Temp 97.8 °F (36.6 °C) (Temporal)   Resp 16   Ht 5' 1\" (1.549 m)   Wt 160 lb (72.6 kg)   SpO2 92%   BMI 30.23 kg/m²   24HR INTAKE/OUTPUT:  No intake or output data in the 24 hours ending 08/15/22 1028  LUNGS:  No increased work of breathing, good air exchange, clear to auscultation bilaterally, no crackles or wheezing  CARDIOVASCULAR:  Normal apical impulse, regular rate and rhythm, normal S1 and S2, no S3 or S4, and no murmur noted  Data    CBC with Differential:    Lab Results   Component Value Date/Time    WBC 9.9 08/10/2022 04:54 AM    RBC 4.98 08/10/2022 04:54 AM    HGB 13.0 08/10/2022 04:54 AM    HCT 42.3 08/10/2022 04:54 AM     08/10/2022 04:54 AM    MCV 84.9 08/10/2022 04:54 AM    MCH 26.1 08/10/2022 04:54 AM    MCHC 30.7 08/10/2022 04:54 AM    RDW 17.2 08/10/2022 04:54 AM    SEGSPCT 76 12/08/2013 06:56 AM    LYMPHOPCT 12.7 08/10/2022 04:54 AM    MONOPCT 7.1 08/10/2022 04:54 AM    BASOPCT 0.4 08/10/2022 04:54 AM    MONOSABS 0.70 08/10/2022 04:54 AM    LYMPHSABS 1.25 08/10/2022 04:54 AM    EOSABS 0.00 08/10/2022 04:54 AM    BASOSABS 0.04 08/10/2022 04:54 AM     CMP:    Lab Results   Component Value Date/Time     08/15/2022 07:30 AM    K 4.0 08/15/2022 07:30 AM    K 3.1 08/11/2022 06:13 AM     08/15/2022 07:30 AM    CO2 27 08/15/2022 07:30 AM    BUN 12 08/15/2022 07:30 AM    CREATININE 0.7 08/15/2022 07:30 AM    GFRAA >60 08/15/2022 07:30 AM    LABGLOM >60 08/15/2022 07:30 AM    GLUCOSE 93 08/15/2022 07:30 AM    PROT 7.1 08/10/2022 04:54 AM    LABALBU 3.6 08/10/2022 04:54 AM    CALCIUM 8.9 08/15/2022 07:30 AM    BILITOT 0.7 08/10/2022 04:54 AM    ALKPHOS 91 08/10/2022 04:54 AM    AST 50 08/10/2022 04:54 AM    ALT 22 08/10/2022 04:54 AM     PT/INR:    Lab Results   Component Value Date/Time    PROTIME 11.1 05/31/2022 03:44 PM    INR 1.0 05/31/2022 03:44 PM       ASSESSMENT AND PLAN      Principal Problem:    Severe manic bipolar 1 disorder with psychotic behavior ([facility] [address]  Plan stabilizing  Diabetes mellitus  Exocrine pancreatic insufficiency    Electronically signed by Julia Gilmore MD on 8/15/2022 at 10:28 AM

## 2022-08-16 VITALS
OXYGEN SATURATION: 91 % | WEIGHT: 160 LBS | RESPIRATION RATE: 17 BRPM | TEMPERATURE: 98.4 F | HEIGHT: 61 IN | BODY MASS INDEX: 30.21 KG/M2 | SYSTOLIC BLOOD PRESSURE: 135 MMHG | HEART RATE: 72 BPM | DIASTOLIC BLOOD PRESSURE: 60 MMHG

## 2022-08-16 LAB
METER GLUCOSE: 124 MG/DL (ref 74–99)
METER GLUCOSE: 133 MG/DL (ref 74–99)

## 2022-08-16 PROCEDURE — 6370000000 HC RX 637 (ALT 250 FOR IP): Performed by: INTERNAL MEDICINE

## 2022-08-16 PROCEDURE — 99239 HOSP IP/OBS DSCHRG MGMT >30: CPT | Performed by: NURSE PRACTITIONER

## 2022-08-16 PROCEDURE — 82962 GLUCOSE BLOOD TEST: CPT

## 2022-08-16 PROCEDURE — 6370000000 HC RX 637 (ALT 250 FOR IP): Performed by: NURSE PRACTITIONER

## 2022-08-16 RX ORDER — RISPERIDONE 0.5 MG/1
0.5 TABLET, ORALLY DISINTEGRATING ORAL 2 TIMES DAILY
Qty: 60 TABLET | Refills: 3 | Status: SHIPPED | OUTPATIENT
Start: 2022-08-16

## 2022-08-16 RX ORDER — OXCARBAZEPINE 300 MG/1
300 TABLET, FILM COATED ORAL 2 TIMES DAILY
Qty: 60 TABLET | Refills: 3 | Status: SHIPPED | OUTPATIENT
Start: 2022-08-16

## 2022-08-16 RX ADMIN — PANCRELIPASE 72000 UNITS: 36000; 180000; 114000 CAPSULE, DELAYED RELEASE PELLETS ORAL at 13:12

## 2022-08-16 RX ADMIN — METFORMIN HYDROCHLORIDE 1000 MG: 500 TABLET ORAL at 09:41

## 2022-08-16 RX ADMIN — PANCRELIPASE 72000 UNITS: 36000; 180000; 114000 CAPSULE, DELAYED RELEASE PELLETS ORAL at 09:39

## 2022-08-16 RX ADMIN — OXCARBAZEPINE 300 MG: 300 TABLET, FILM COATED ORAL at 09:40

## 2022-08-16 RX ADMIN — PANTOPRAZOLE SODIUM 40 MG: 40 TABLET, DELAYED RELEASE ORAL at 06:27

## 2022-08-16 RX ADMIN — CLOPIDOGREL BISULFATE 75 MG: 75 TABLET, FILM COATED ORAL at 09:39

## 2022-08-16 RX ADMIN — RISPERIDONE 0.5 MG: 0.5 TABLET, ORALLY DISINTEGRATING ORAL at 09:40

## 2022-08-16 RX ADMIN — ATENOLOL 50 MG: 50 TABLET ORAL at 09:41

## 2022-08-16 RX ADMIN — ANORECTAL OINTMENT: 15.7; .44; 24; 20.6 OINTMENT TOPICAL at 09:43

## 2022-08-16 RX ADMIN — POTASSIUM CHLORIDE 20 MEQ: 20 TABLET, EXTENDED RELEASE ORAL at 09:41

## 2022-08-16 RX ADMIN — ATORVASTATIN CALCIUM 40 MG: 40 TABLET, FILM COATED ORAL at 12:08

## 2022-08-16 NOTE — PROGRESS NOTES
Comprehensive Nutrition Assessment    Type and Reason for Visit:  Initial, Wound, Consult    Nutrition Recommendations/Plan:   Recommend and start Glucerna supplement BID to help meet increased nutritional needs from wound healing. Uri wound healing supplement is out of stock at this time and will start when product is back in stock. Malnutrition Assessment:  Malnutrition Status:  Insufficient data (08/16/22 1036)    Context:  Acute Illness     Findings of the 6 clinical characteristics of malnutrition:  Energy Intake:  No significant decrease in energy intake  Weight Loss:  Unable to assess (d/t no actual weights available since admission)     Body Fat Loss:  Unable to assess     Muscle Mass Loss:  Unable to assess    Fluid Accumulation:  No significant fluid accumulation     Strength:  Not Performed    Nutrition Assessment:    Patient adm w/ acute psychosis ; hx of depression and bipolar ; wound noted ; hx of DM and CAD ; hx of pancreatitis and exocrine pancreatic insufficiency ; will start ONS    Nutrition Related Findings:    I&Os WNL, no edema, A&O x 3, chronic diarrhea, redness to heels, erosion to buttocks ; Wound Type: Open Wounds, Deep Tissue Injury (wound x 1 noted to coccyx)       Current Nutrition Intake & Therapies:    Average Meal Intake: %     ADULT DIET; Regular; 3 carb choices (45 gm/meal)    Anthropometric Measures:  Height: 5' 1\" (154.9 cm)  Ideal Body Weight (IBW): 105 lbs (48 kg)       Current Body Weight: 160 lb (72.6 kg) (8/10, stated), 152.4 % IBW. Weight Source: Stated  Current BMI (kg/m2): 30.2  Usual Body Weight:  (EMR shows past weights of 181# bedscale on 4/21/22 and 193# actual on 4/16/19)                       BMI Categories: Obese Class 1 (BMI 30.0-34. 9)    Estimated Daily Nutrient Needs:  Energy Requirements Based On: Formula  Weight Used for Energy Requirements: Current  Energy (kcal/day): 5311-2659 (REE 1140 x 1.2 SF)  Weight Used for Protein Requirements: Ideal  Protein (g/day): 70-90 (1.5-1.8g/kg IBW)  Method Used for Fluid Requirements: 1 ml/kcal  Fluid (ml/day): 7343-3256    Nutrition Diagnosis:   Increased nutrient needs related to increase demand for energy/nutrients as evidenced by wounds    Nutrition Interventions:   Food and/or Nutrient Delivery: Continue Current Diet, Start Oral Nutrition Supplement  Nutrition Education/Counseling: Education not indicated  Coordination of Nutrition Care: Continue to monitor while inpatient       Goals:  Previous Goal Met: Progressing toward Goal(s)  Goals: PO intake 75% or greater, by next RD assessment       Nutrition Monitoring and Evaluation:   Behavioral-Environmental Outcomes: None Identified  Food/Nutrient Intake Outcomes: Food and Nutrient Intake, Supplement Intake  Physical Signs/Symptoms Outcomes: Biochemical Data, Chewing or Swallowing, GI Status, Diarrhea, Fluid Status or Edema, Hemodynamic Status, Meal Time Behavior, Nutrition Focused Physical Findings, Skin, Weight    Discharge Planning:     Too soon to determine     Marlena Polanco RD, LD  Contact: [phone]

## 2022-08-16 NOTE — PROCEDURES
[facility]  Discharge Note    Belongings returned to patient    Pt discharged with followings belongings:   Dental Appliances: None  Vision - Corrective Lenses: None  Hearing Aid: None  Jewelry: None  Body Piercings Removed: N/A  Clothing: Panoemi Ogluis alberto  Other Valuables: Woodrow Rao sent home with patient or returned to patient. Patient education on aftercare instructions: yes. Patient verbalize understanding of AVS:  yes. Status EXAM upon discharge:  Mental Status and Behavioral Exam  Normal: No  Level of Assistance: Independent/Self  Facial Expression: Brightened  Affect: Congruent  Level of Consciousness: Alert  Frequency of Checks: 4 times per hour, close  Mood:Normal: No  Mood: Anxious  Motor Activity:Normal: No  Motor Activity: Decreased  Eye Contact: Fair  Observed Behavior: Friendly, Cooperative  Sexual Misconduct History: Current - no  Preception: [place] to person, [place] to time, [place] to place  Attention:Normal: No  Attention: Distractible  Thought Processes:  (impaired)  Thought Content:Normal: No  Thought Content: Poverty of content  Depression Symptoms: Change in energy level, Impaired concentration  Anxiety Symptoms: Generalized  Kiley Symptoms: No problems reported or observed.   Hallucinations: None  Delusions: No  Delusions:  (None observed)  Memory:Normal: No  Memory: Poor recent  Insight and Judgment: No  Insight and Judgment: Poor judgment, Poor insight    Tobacco Screening:  Practical Counseling, on admission, val X, if applicable and completed (first 3 are required if patient doesn't refuse)refused pt does not smoke:            ( ) Recognizing danger situations (included triggers and roadblocks)                    ( ) Coping skills (new ways to manage stress,relaxation techniques, changing routine, distraction)                                                           ( ) Basic information about quitting (benefits of quitting, techniques in how to quit, available resources  ( ) Referral for counseling faxed to [facility]                                                                                                                   ( ) Patient refused counseling  ( ) Patient refused referral  ( ) Patient refused prescription upon discharge  ( ) Patient has not smoked in the last 30 days    Metabolic Screening:    Lab Results   Component Value Date    LABA1C 6.8 (H) 08/11/2022       Lab Results   Component Value Date    CHOL 134 08/11/2022     Lab Results   Component Value Date    TRIG 76 08/11/2022     Lab Results   Component Value Date    HDL 39 08/11/2022     No components found for: [facility] EVALUATION AND TREATMENT [place]  Lab Results   Component Value Date    LABVLDL 15 08/11/2022       Eugenio Esposito RN

## 2022-08-16 NOTE — GROUP NOTE
Group Therapy Note    Date: 8/16/2022    Group Start Time: 1105  Group End Time: 4328  Group Topic: Cognitive Skills    [facility]    MARIA E العراقي LSW        Group Therapy Note    Attendees: 11       Patient's Goal:  Pt will be able to verbalize understanding of sincere apologizes, and their importance in healthy relationships. Notes:  Pt made connections and participated in group. Status After Intervention:  Improved    Participation Level:  Active Listener and Interactive    Participation Quality: Appropriate, Attentive, Sharing, and Supportive      Speech:  normal      Thought Process/Content: Logical  Linear      Affective Functioning: Congruent      Mood: depressed      Level of consciousness:  Alert and Attentive      Response to Learning: Able to verbalize current knowledge/experience      Endings: None Reported    Modes of Intervention: Education, Support, Socialization, and Exploration      Discipline Responsible: /Counselor      Signature:  MARIA E العراقي LSW

## 2022-08-16 NOTE — PROGRESS NOTES
CLINICAL PHARMACY NOTE: MEDS TO BEDS    Total # of Prescriptions Filled: 2   The following medications were delivered to the patient:  RISPERDAL 0.5 MG ODT   TRILEPTAL 300 MG    Additional Documentation:

## 2022-08-16 NOTE — PLAN OF CARE
Problem: Safety - Adult  Goal: Free from fall injury  8/15/2022 2023 by Zara Martell RN  Outcome: Progressing     Problem: Anxiety  Goal: Will report anxiety at manageable levels  Description: INTERVENTIONS:  1. Administer medication as ordered  2. Teach and rehearse alternative coping skills  3. Provide emotional support with 1:1 interaction with staff  8/15/2022 2023 by Zara Martell RN  Outcome: Progressing     Problem: Drug Abuse/Detox  Goal: Will have no detox symptoms and will verbalize plan for changing drug-related behavior  Description: INTERVENTIONS:  1. Administer medication as ordered  2. Monitor physical status  3. Provide emotional support with 1:1 interaction with staff  4.  Encourage  recovery focused treatment   8/15/2022 2023 by Zara Martell RN  Outcome: Progressing

## 2022-08-16 NOTE — PLAN OF CARE
Pt denies SI/HI & AVH. Poor cooperative and appropriate. Out on the unit speaking with other patients. Affect is bright. Complaint with medications, eats scheduled meals. Looking forward to being discharged  Problem: Decision Making  Goal: Pt/Family able to effectively weigh alternatives and participate in decision making related to treatment and care  Description: INTERVENTIONS:  1. Determine when there are differences between patient's view, family's view, and healthcare provider's view of condition  2. Facilitate patient and family articulation of goals for care  3. Help patient and family identify pros/cons of alternative solutions  4. Provide information as requested by patient/family  5. Respect patient/family right to receive or not to receive information  6. Serve as a liaison between patient and family and health care team  7. Initiate Consults from Ethics, Palliative Care or initiate [address] [place] [address] as is appropriate  Outcome: Progressing     Problem: Confusion  Goal: Confusion, delirium, dementia, or psychosis is improved or at baseline  Description: INTERVENTIONS:  1. Assess for possible contributors to thought disturbance, including medications, impaired vision or hearing, underlying metabolic abnormalities, dehydration, psychiatric diagnoses, and notify attending LIP  2. [place] high risk fall precautions, as indicated  3. Provide frequent short contacts to provide reality reorientation, refocusing and direction  4. Decrease environmental stimuli, including noise as appropriate  5. Monitor and intervene to maintain adequate nutrition, hydration, elimination, sleep and activity  6. If unable to ensure safety without constant attention obtain sitter and review sitter guidelines with assigned personnel  7.  Initiate Psychosocial CNS and Spiritual Care consult, as indicated  Outcome: Progressing

## 2022-08-16 NOTE — DISCHARGE SUMMARY
DISCHARGE SUMMARY      Patient ID:  Karo Goldsmith  [id number]  86 y.o.  1940    Admit date: 8/10/2022    Discharge date and time: 2022    Admitting Physician: Arcadio Hodges MD     Discharge Physician: Dr Pranav Tomas MD    Discharge Diagnoses:   Patient Active Problem List   Diagnosis    CAD (coronary artery disease)    Hyperlipidemia    Hypertension    Diabetes mellitus ([facility] [address]    History of COVID-19    Anemia    Stool guaiac positive    Acute psychosis ([facility] [address]    Severe manic bipolar 1 disorder with psychotic behavior ([facility] [address]    Cannabis abuse       Admission Condition: poor    Discharged Condition: stable    Admission Circumstance: Patient was presented to [facility] emergency department on a pink slip due to delusions, paranoia and hallucinations. PAST MEDICAL/PSYCHIATRIC HISTORY:   Past Medical History:   Diagnosis Date    CAD (coronary artery disease)     Cataract (lens) fragments in eye following cataract surgery     Diabetes mellitus ([facility] [address]     Hyperlipidemia     Hypertension     Pancreatitis        FAMILY/SOCIAL HISTORY:  Family History   Problem Relation Age of Onset    Heart Disease Father          age 71    Heart Disease Mother         age 80      Social History     Socioeconomic History    Marital status:       Spouse name: Not on file    Number of children: Not on file    Years of education: Not on file    Highest education level: Not on file   Occupational History    Not on file   Tobacco Use    Smoking status: Former     Types: Cigarettes    Smokeless tobacco: Never   Vaping Use    Vaping Use: Never used   Substance and Sexual Activity    Alcohol use: No    Drug use: No    Sexual activity: Not on file   Other Topics Concern    Not on file   Social History Narrative    Not on file     Social Determinants of Health     Financial Resource Strain: Not on file   Food Insecurity: Not on file   Transportation Needs: Not on file   Physical Activity: Not on file Stress: Not on file   Social Connections: Not on file   Intimate Partner Violence: Not on file   Housing Stability: Not on file       MEDICATIONS:    Current Facility-Administered Medications:     menthol-zinc oxide (CALMOSEPTINE) 0.44-20.6 % ointment, , Topical, BID, Given at 08/16/22 0943 **AND** menthol-zinc oxide (CALMOSEPTINE) 0.44-20.6 % ointment, , Topical, TID PRN, Shefali Hamilton MD    insulin glargine-yfgn (SEMGLEE-YFGN) injection vial 15 Units, 15 Units, SubCUTAneous, Daily, Gerald Cash MD, 15 Units at 08/15/22 0909    loperamide (IMODIUM) capsule 2 mg, 2 mg, Oral, 4x Daily PRN, PETTY Laureano - CNP, 2 mg at 08/15/22 2100    potassium chloride (KLOR-CON M) extended release tablet 20 mEq, 20 mEq, Oral, BID WC, Gerald Cash MD, 20 mEq at 08/16/22 0941    risperiDONE (RISPERDAL M-TABS) disintegrating tablet 0.5 mg, 0.5 mg, Oral, BID, Man Pickens APRN - CNP, 0.5 mg at 08/16/22 0940    OXcarbazepine (TRILEPTAL) tablet 300 mg, 300 mg, Oral, BID, Man Pickens APRN - CNP, 300 mg at 08/16/22 0940    melatonin tablet 6 mg, 6 mg, Oral, Daily, Man Pickens APRN - CNP, 6 mg at 08/15/22 1746    chlordiazePOXIDE (LIBRIUM) capsule 10 mg, 10 mg, Oral, BID PRN, Man Pickens APRN - CNP    acetaminophen (TYLENOL) tablet 650 mg, 650 mg, Oral, Q4H PRN, Shefali Hamilton MD, 650 mg at 08/13/22 0959    magnesium hydroxide (MILK OF MAGNESIA) 400 MG/5ML suspension 30 mL, 30 mL, Oral, Daily PRN, Shefali Hamilton MD    aluminum & magnesium hydroxide-simethicone (MAALOX) 200-200-20 MG/5ML suspension 30 mL, 30 mL, Oral, PRN, Shefali Hamilton MD    hydrOXYzine HCl (ATARAX) tablet 50 mg, 50 mg, Oral, TID PRN, Shefali Hamilton MD, 50 mg at 08/15/22 2100    haloperidol (HALDOL) tablet 3 mg, 3 mg, Oral, Q6H PRN, 3 mg at 08/11/22 0837 **OR** haloperidol lactate (HALDOL) injection 3 mg, 3 mg, IntraMUSCular, Q6H PRN, Shefali Hamilton MD    insulin lispro (HUMALOG) injection vial 0-16 Units, 0-16 Units, SubCUTAneous, TID Irvin LUGO MD    insulin lispro (HUMALOG) injection vial 0-4 Units, 0-4 Units, SubCUTAneous, Nightly, Irvin Reece MD    glucose chewable tablet 16 g, 4 tablet, Oral, PRN, Irvin Reece MD    dextrose bolus 10% 125 mL, 125 mL, IntraVENous, PRN **OR** dextrose bolus 10% 250 mL, 250 mL, IntraVENous, PRN, Irvin Reece MD    glucagon (rDNA) injection 1 mg, 1 mg, SubCUTAneous, PRN, Irvin Reece MD    dextrose 10 % infusion, , IntraVENous, Continuous PRN, Irvin Reece MD    atenolol (TENORMIN) tablet 50 mg, 50 mg, Oral, Daily, Irvin Reece MD, 50 mg at 08/16/22 0941    atorvastatin (LIPITOR) tablet 40 mg, 40 mg, Oral, Daily, Irvin Reece MD, 40 mg at 08/16/22 1208    clopidogrel (PLAVIX) tablet 75 mg, 75 mg, Oral, Daily, Irvin Reece MD, 75 mg at 08/16/22 0939    lipase-protease-amylase (CREON) delayed release capsule 72,000 Units, 72,000 Units, Oral, TID Irvin LUGO MD, 72,000 Units at 08/16/22 1312    metFORMIN (GLUCOPHAGE) tablet 1,000 mg, 1,000 mg, Oral, BID Irvin LUGO MD, 1,000 mg at 08/16/22 0941    pantoprazole (PROTONIX) tablet 40 mg, 40 mg, Oral, QAM Irvin MD, 40 mg at 08/16/22 6743    Examination:  /60   Pulse 72   Temp 98.4 °F (36.9 °C) (Temporal)   Resp 17   Ht 5' 1\" (1.549 m)   Wt 160 lb (72.6 kg)   SpO2 91%   BMI 30.23 kg/m²   Gait - steady    HOSPITAL COURSE[de-identified]  Patient was admitted to the unit on 8/10/2022 was closely monitored for psychosis and mood lability. She was evaluated and treated with Trileptal 300 mg twice daily for mood stabilization and Risperdal 0.5 mg twice daily. Medical events were insignificant and patient continued to improve on the floor. She started coming out of her room she was attending groups to socializing with peers. She never made any suicidal statements or any suicidal gestures while in the unit.   Social workers obtain confirmation patient's daughter who was able to voice any concerns that she had. She reported no safety concerns no access to any guns reported patient be staying with her on discharge. Treatment team felt the patient obtain the maximum benefit for her hospitalization She was set up with an outpatient mental health agency for outpatient follow-up services . At the time of discharge patient  did not show impulsive behavior. She was up on the unit she was attending groups and socializing with peers. She vehemently denied any suicidal homicidal ideations intent or plan. She was eating well and sleeping well there are no neurovegetative signs or symptoms of depression she denied any auditory visualizations. There are no overt or covert signs psychosis. She was appreciative of the help that she received here. This patient no longer meets criteria for inpatient hospitalization. No AVH or paranoid thoughts  No hopeless or worthless feeling  No active SI/HI  Appetite:  [x] Normal  [] Increased  [] Decreased    Sleep:       [x] Normal  [] Fair       [] Poor            Energy:    [x] Normal  [] Increased  [] Decreased     SI [] Present  [x] Absent  HI  []Present  [x] Absent   Aggression:  [] yes  [x] no  Patient is [x] able  [] unable to CONTRACT FOR SAFETY   Medication side effects(SE):  [x] None(Psych. Meds.) [] Other      Mental Status Examination on discharge:    Level of consciousness:  within normal limits   Appearance:  well-appearing  Behavior/Motor:  no abnormalities noted  Attitude toward examiner:  attentive and good eye contact  Speech:  spontaneous, normal rate and normal volume   Mood: \"My mood is good. \"  Affect: Bright appropriate and pleasant  Thought processes: Linear without flight of ideas loose associations  Thought content: Devoid of any auditory visual loose [place] delusions or other perceptual normalities.   Denies SI/HI intent or plan  Cognition:  oriented to person, place, and time Concentration intact  Memory intact  Insight good   Judgement fair   Fund of Knowledge adequate      ASSESSMENT:  Patient symptoms are:  [x] Well controlled  [x] Improving  [] Worsening  [] No change    Reason for more than one antipsychotic:  [x] N/A  [] 3 Failed Monotherapy attempts (Drugs tried:)  [] Crossover to a new antipsychotic  [] Taper to Monotherapy from Polypharmacy  [] Augmentation of clozapine therapy due to treatment resistance to single therapy    Diagnosis:  Principal Problem:    Severe manic bipolar 1 disorder with psychotic behavior ([facility] [address]  Active Problems:    Cannabis abuse  Resolved Problems:    * No resolved hospital problems. *      LABS:    No results for input(s): WBC, HGB, PLT in the last 72 hours. Recent Labs     08/14/22  1315 08/15/22  0730    144   K 4.1 4.0    107   CO2 25 27   BUN 12 12   CREATININE 0.7 0.7   GLUCOSE 97 93     No results for input(s): BILITOT, ALKPHOS, AST, ALT in the last 72 hours. Lab Results   Component Value Date/Time    LABAMPH NOT DETECTED 08/10/2022 08:06 AM    BARBSCNU NOT DETECTED 08/10/2022 08:06 AM    LABBENZ POSITIVE 08/10/2022 08:06 AM    LABMETH NOT DETECTED 08/10/2022 08:06 AM    OPIATESCREENURINE NOT DETECTED 08/10/2022 08:06 AM    PHENCYCLIDINESCREENURINE NOT DETECTED 08/10/2022 08:06 AM    ETOH <10 08/10/2022 04:54 AM     Lab Results   Component Value Date/Time    TSH 3.200 07/30/2015 01:12 PM     No results found for: LITHIUM  No results found for: VALPROATE, CBMZ    RISK ASSESSMENT AT DISCHARGE: Low risk for suicide and homicide. Treatment Plan:  Reviewed current Medications with the patient. Education provided on the complaince with treatment. Risks, benefits, side effects, drug-to-drug interactions and alternatives to treatment were discussed. Encourage patient to attend outpatient follow up appointment and therapy.     Patient was advised to call the outpatient provider, visit the nearest ED or call 911 if symptoms are not manageable. Patient's family member was contacted prior to the discharge. Medication List        START taking these medications      OXcarbazepine 300 MG tablet  Commonly known as: TRILEPTAL  Take 1 tablet by mouth in the morning and 1 tablet before bedtime. risperiDONE 0.5 MG disintegrating tablet  Commonly known as: RISPERDAL M-TABS  Take 1 tablet by mouth in the morning and 1 tablet before bedtime.             CONTINUE taking these medications      atenolol 50 MG tablet  Commonly known as: TENORMIN  Take 1 tablet by mouth daily     atorvastatin 40 MG tablet  Commonly known as: LIPITOR     clopidogrel 75 MG tablet  Commonly known as: PLAVIX     Creon 42048-752186 units Cpep delayed release capsule  Generic drug: lipase-protease-amylase     Insulin Degludec 200 UNIT/ML Sopn     Invokana 300 MG Tabs tablet  Generic drug: canagliflozin     pantoprazole 40 MG tablet  Commonly known as: PROTONIX  Take 1 tablet by mouth every morning (before breakfast)            STOP taking these medications      ALPRAZolam 0.5 MG tablet  Commonly known as: XANAX     citalopram 20 MG tablet  Commonly known as: CELEXA     metFORMIN 1000 MG tablet  Commonly known as: GLUCOPHAGE     traZODone 50 MG tablet  Commonly known as: DESYREL               Where to Get Your Medications        These medications were sent to [facility] [address] [address] [address]      Phone: [phone]   OXcarbazepine 300 MG tablet  risperiDONE 0.5 MG disintegrating tablet       Patient is counseled if she continues to abuse drugs or alcohol she could act out impulsively causing serious harm to herself or others even though it may be unintentional.  She demonstrated understanding of this and has the capacity understand this    Patient is counseled her mental health treatment will be difficult to optimize with ongoing use of drugs or alcohol she demonstrated understanding of this and has the capacity to understand this     Patient is counseled that if she uses any amount of opiates after any clean time she could have an unintentional overdose she demonstrated understanding standing of this and has  the capacity understand this    Patient is counseled she must remain compliant with all medications outpatient follow-up appointments    Patient is discharged home in stable condition      TIME SPEND - 35 MINUTES TO COMPLETE THE EVALUATION, DISCHARGE SUMMARY, MEDICATION RECONCILIATION AND FOLLOW UP CARE     Signed:  PETTY Connolly CNP  4/47/7195  2:25 PM

## 2022-08-16 NOTE — PROGRESS NOTES
Department of [facility]  Progress Note      SUBJECTIVE: Clears thinking although mental status score suggests dementia she most likely can function at her house now on the new medications family should consider supervision    OBJECTIVE abdomen is soft and nontender    Medications    Current Facility-Administered Medications: menthol-zinc oxide (CALMOSEPTINE) 0.44-20.6 % ointment, , Topical, BID **AND** menthol-zinc oxide (CALMOSEPTINE) 0.44-20.6 % ointment, , Topical, TID PRN  insulin glargine-yfgn (SEMGLEE-YFGN) injection vial 15 Units, 15 Units, SubCUTAneous, Daily  loperamide (IMODIUM) capsule 2 mg, 2 mg, Oral, 4x Daily PRN  potassium chloride (KLOR-CON M) extended release tablet 20 mEq, 20 mEq, Oral, BID WC  risperiDONE (RISPERDAL M-TABS) disintegrating tablet 0.5 mg, 0.5 mg, Oral, BID  OXcarbazepine (TRILEPTAL) tablet 300 mg, 300 mg, Oral, BID  melatonin tablet 6 mg, 6 mg, Oral, Daily  chlordiazePOXIDE (LIBRIUM) capsule 10 mg, 10 mg, Oral, BID PRN  acetaminophen (TYLENOL) tablet 650 mg, 650 mg, Oral, Q4H PRN  magnesium hydroxide (MILK OF MAGNESIA) 400 MG/5ML suspension 30 mL, 30 mL, Oral, Daily PRN  aluminum & magnesium hydroxide-simethicone (MAALOX) 200-200-20 MG/5ML suspension 30 mL, 30 mL, Oral, PRN  hydrOXYzine HCl (ATARAX) tablet 50 mg, 50 mg, Oral, TID PRN  haloperidol (HALDOL) tablet 3 mg, 3 mg, Oral, Q6H PRN **OR** haloperidol lactate (HALDOL) injection 3 mg, 3 mg, IntraMUSCular, Q6H PRN  insulin lispro (HUMALOG) injection vial 0-16 Units, 0-16 Units, SubCUTAneous, TID WC  insulin lispro (HUMALOG) injection vial 0-4 Units, 0-4 Units, SubCUTAneous, Nightly  glucose chewable tablet 16 g, 4 tablet, Oral, PRN  dextrose bolus 10% 125 mL, 125 mL, IntraVENous, PRN **OR** dextrose bolus 10% 250 mL, 250 mL, IntraVENous, PRN  glucagon (rDNA) injection 1 mg, 1 mg, SubCUTAneous, PRN  dextrose 10 % infusion, , IntraVENous, Continuous PRN  atenolol (TENORMIN) tablet 50 mg, 50 mg, Oral, Daily  atorvastatin (LIPITOR) tablet 40 mg, 40 mg, Oral, Daily  clopidogrel (PLAVIX) tablet 75 mg, 75 mg, Oral, Daily  lipase-protease-amylase (CREON) delayed release capsule 72,000 Units, 72,000 Units, Oral, TID WC  metFORMIN (GLUCOPHAGE) tablet 1,000 mg, 1,000 mg, Oral, BID WC  pantoprazole (PROTONIX) tablet 40 mg, 40 mg, Oral, QAM AC  Physical    VITALS:  /75   Pulse 64   Temp 97.4 °F (36.3 °C) (Temporal)   Resp 16   Ht 5' 1\" (1.549 m)   Wt 160 lb (72.6 kg)   SpO2 94%   BMI 30.23 kg/m²   24HR INTAKE/OUTPUT:  No intake or output data in the 24 hours ending 08/16/22 0903  LUNGS:  No increased work of breathing, good air exchange, clear to auscultation bilaterally, no crackles or wheezing  CARDIOVASCULAR:  Normal apical impulse, regular rate and rhythm, normal S1 and S2, no S3 or S4, and no murmur noted  Data    CBC with Differential:    Lab Results   Component Value Date/Time    WBC 9.9 08/10/2022 04:54 AM    RBC 4.98 08/10/2022 04:54 AM    HGB 13.0 08/10/2022 04:54 AM    HCT 42.3 08/10/2022 04:54 AM     08/10/2022 04:54 AM    MCV 84.9 08/10/2022 04:54 AM    MCH 26.1 08/10/2022 04:54 AM    MCHC 30.7 08/10/2022 04:54 AM    RDW 17.2 08/10/2022 04:54 AM    SEGSPCT 76 12/08/2013 06:56 AM    LYMPHOPCT 12.7 08/10/2022 04:54 AM    MONOPCT 7.1 08/10/2022 04:54 AM    BASOPCT 0.4 08/10/2022 04:54 AM    MONOSABS 0.70 08/10/2022 04:54 AM    LYMPHSABS 1.25 08/10/2022 04:54 AM    EOSABS 0.00 08/10/2022 04:54 AM    BASOSABS 0.04 08/10/2022 04:54 AM     CMP:    Lab Results   Component Value Date/Time     08/15/2022 07:30 AM    K 4.0 08/15/2022 07:30 AM    K 3.1 08/11/2022 06:13 AM     08/15/2022 07:30 AM    CO2 27 08/15/2022 07:30 AM    BUN 12 08/15/2022 07:30 AM    CREATININE 0.7 08/15/2022 07:30 AM    GFRAA >60 08/15/2022 07:30 AM    LABGLOM >60 08/15/2022 07:30 AM    GLUCOSE 93 08/15/2022 07:30 AM    PROT 7.1 08/10/2022 04:54 AM    LABALBU 3.6 08/10/2022 04:54 AM    CALCIUM 8.9 08/15/2022 07:30 AM    BILITOT 0.7 08/10/2022 04:54 AM    ALKPHOS 91 08/10/2022 04:54 AM    AST 50 08/10/2022 04:54 AM    ALT 22 08/10/2022 04:54 AM     PT/INR:    Lab Results   Component Value Date/Time    PROTIME 11.1 05/31/2022 03:44 PM    INR 1.0 05/31/2022 03:44 PM       ASSESSMENT AND PLAN      Principal Problem:    Severe manic bipolar 1 disorder with psychotic behavior ([facility] [address]  Plan: Stable  Active Problems:  Diabetes mellitus we will send home on half insulin dosage due to her weight loss plus she does not take mealtime insulin  Coronary artery disease stable  Exocrine pancreatic insufficiency continue the enzymes          Electronically signed by Ashlee Whatley MD on 8/16/2022 at 9:03 AM

## 2022-08-17 NOTE — CARE COORDINATION
Phone call to pt, no answer. Phone call to pt daughter no answer. 2nd phone call to pt and left a vm.

## 2022-10-22 NOTE — CARE COORDINATION
SW attempted to meet with this pt for a daily check in. Pt observed sleeping soundly in the dayroom. SW called out to this pt multiple times, with no response. SW will continue to monitor this pt's moods and behaviors. 22-Oct-2022 21:47

## 2022-11-15 ENCOUNTER — OFFICE VISIT (OUTPATIENT)
Dept: CARDIOLOGY CLINIC | Age: 82
End: 2022-11-15
Payer: MEDICARE

## 2022-11-15 VITALS
BODY MASS INDEX: 30.78 KG/M2 | RESPIRATION RATE: 12 BRPM | SYSTOLIC BLOOD PRESSURE: 132 MMHG | DIASTOLIC BLOOD PRESSURE: 62 MMHG | WEIGHT: 163 LBS | HEIGHT: 61 IN | HEART RATE: 48 BPM

## 2022-11-15 DIAGNOSIS — I25.10 CORONARY ARTERY DISEASE DUE TO LIPID RICH PLAQUE: Primary | ICD-10-CM

## 2022-11-15 DIAGNOSIS — I25.83 CORONARY ARTERY DISEASE DUE TO LIPID RICH PLAQUE: Primary | ICD-10-CM

## 2022-11-15 DIAGNOSIS — R00.1 BRADYCARDIA: ICD-10-CM

## 2022-11-15 PROCEDURE — 3078F DIAST BP <80 MM HG: CPT | Performed by: INTERNAL MEDICINE

## 2022-11-15 PROCEDURE — 93000 ELECTROCARDIOGRAM COMPLETE: CPT | Performed by: INTERNAL MEDICINE

## 2022-11-15 PROCEDURE — G8484 FLU IMMUNIZE NO ADMIN: HCPCS | Performed by: INTERNAL MEDICINE

## 2022-11-15 PROCEDURE — G8417 CALC BMI ABV UP PARAM F/U: HCPCS | Performed by: INTERNAL MEDICINE

## 2022-11-15 PROCEDURE — 1036F TOBACCO NON-USER: CPT | Performed by: INTERNAL MEDICINE

## 2022-11-15 PROCEDURE — 1090F PRES/ABSN URINE INCON ASSESS: CPT | Performed by: INTERNAL MEDICINE

## 2022-11-15 PROCEDURE — 1124F ACP DISCUSS-NO DSCNMKR DOCD: CPT | Performed by: INTERNAL MEDICINE

## 2022-11-15 PROCEDURE — 99213 OFFICE O/P EST LOW 20 MIN: CPT | Performed by: INTERNAL MEDICINE

## 2022-11-15 PROCEDURE — 3074F SYST BP LT 130 MM HG: CPT | Performed by: INTERNAL MEDICINE

## 2022-11-15 PROCEDURE — G8400 PT W/DXA NO RESULTS DOC: HCPCS | Performed by: INTERNAL MEDICINE

## 2022-11-15 PROCEDURE — G8427 DOCREV CUR MEDS BY ELIG CLIN: HCPCS | Performed by: INTERNAL MEDICINE

## 2022-11-15 RX ORDER — CITALOPRAM 20 MG/1
TABLET ORAL
COMMUNITY
Start: 2022-10-26

## 2022-11-15 RX ORDER — IRBESARTAN 150 MG/1
TABLET ORAL
COMMUNITY
Start: 2022-09-12

## 2022-11-15 RX ORDER — ALPRAZOLAM 0.5 MG/1
TABLET ORAL
COMMUNITY
Start: 2022-10-20

## 2022-11-15 NOTE — PROGRESS NOTES
Eva Garcia  1940  Date of Service: 11/15/2022    Patient Active Problem List    Diagnosis Date Noted    Stool guaiac positive 04/19/2022     Priority: High    Anemia 04/18/2022     Priority: High    Severe manic bipolar 1 disorder with psychotic behavior ([facility] [address] 08/11/2022     Priority: Medium    Cannabis abuse 08/11/2022     Priority: Medium    Acute psychosis ([facility] [address] 08/10/2022     Priority: Medium    History of COVID-19 11/12/2021     Overview Note:     Diagnosis added to problem list by Reba Del Rio based on transcribed order from Dr. Jean Oslon. Hypertension     [facility]     CAD (coronary artery disease) 08/12/2015     Overview Note:     7-2015  2.75 x 18-mm Xience drug-eluting stent to the ostial and proximal 1st diagonal.  2.75 x 18-mm Xience drug-eluting stent to the proximal and mid LAD.    5-16   3.0x18 KARINE prox LAD      Hyperlipidemia 08/12/2015       Social History     Socioeconomic History    Marital status:      Spouse name: None    Number of children: None    Years of education: None    Highest education level: None   Tobacco Use    Smoking status: Former     Types: Cigarettes    Smokeless tobacco: Never   Vaping Use    Vaping Use: Never used   Substance and Sexual Activity    Alcohol use: No    Drug use: No       Current Outpatient Medications   Medication Sig Dispense Refill    ALPRAZolam (XANAX) 0.5 MG tablet TAKE 1 TABLET ORALLY EVERY DAY AT BEDTIME AS NEEDED FOR SLEEP FOR 30 DAYS      irbesartan (AVAPRO) 150 MG tablet take 1 tablet by mouth once daily      citalopram (CELEXA) 20 MG tablet       lipase-protease-amylase (CREON) 09548-788591 units CPEP delayed release capsule Take 2 capsules by mouth 3 times daily (with meals)      clopidogrel (PLAVIX) 75 MG tablet Take 75 mg by mouth in the morning.       pantoprazole (PROTONIX) 40 MG tablet Take 1 tablet by mouth every morning (before breakfast) 30 tablet 3    Insulin Degludec 200 UNIT/ML SOPN Inject 70 Units into the skin daily      INVOKANA 300 MG TABS tablet       atenolol (TENORMIN) 50 MG tablet Take 1 tablet by mouth daily 30 tablet 3    atorvastatin (LIPITOR) 40 MG tablet Take 40 mg by mouth daily       OXcarbazepine (TRILEPTAL) 300 MG tablet Take 1 tablet by mouth in the morning and 1 tablet before bedtime. 60 tablet 3    risperiDONE (RISPERDAL M-TABS) 0.5 MG disintegrating tablet Take 1 tablet by mouth in the morning and 1 tablet before bedtime. 60 tablet 3     No current facility-administered medications for this visit. Allergies   Allergen Reactions    Fenofibrate Other (See Comments)    Pcn [Penicillins] Hives    Zocor [Simvastatin] Hives       Chief Complaint:  Belia Davila is here today for follow up and management/recomendations for CAD     History of Present Illness: Belia Davila states that She does house work, does yard work, goes up the stairs, & goes shopping. She also just ran a retreat this weekend where she had to do a lot of walking, pulling wagons up inclines, etc.   She denies any chest pain, dyspnea on exertion, orthopnea/PND, or lower extremity edema. She denies any palpitations or presyncopal symptoms. REVIEW OF SYSTEMS:  As above. Patient does not complain of any fever, chills, nausea, vomiting or diarrhea. No focal, motor or neurological deficits. No changes in his/her vision, hearing, bowel or bladder habits. She is not known to have a history of thyroid problems. No recent nose bleeds. PHYSICAL EXAM:  Vitals:    11/15/22 1126 11/15/22 1127   BP: (!) 154/64 132/62   Pulse: (!) 48    Resp: 12    Weight: 163 lb (73.9 kg)    Height: 5' 1\" (1.549 m)        GENERAL:  She is alert and oriented x 3, communicates well, in no distress. NECK:  No masses, trachea is mid position. Supple, full ROM, no JVD or bruits. No palpable thyromegaly or lymphadenopathy. HEART:  Regular  rhythm. Normal S1 and S2.  There are no abnormal murmurs on exam..    LUNGS:  Clear to auscultation bilaterally. No use of accessory muscles. symmetrical excursion. ABDOMEN: Soft, non-tender. Normal bowel sounds. EXTREMITIES:  Full ROM x 4. No bilateral lower extremity edema exam.  Good distal pulses. EYES:  Extraocular muscles intact. PERRL. Normal lids & conjunctiva. ENT:  Nares are clear & not bleeding. Moist mucosa. Normal lips formation. No external masses   NEURO: no tremors, full ROM x 4, EOMI. SKIN:  Warm, dry and intact. Normal turgor. EKG: Sinus, 48 bpm, nl axis, inferior Q waves. Nonspecific ST - T wave changes. Poor R-wave progression. Assessment:   Coronary artery disease as outlined above. No ischemic symptoms. Hypertension, well controlled today. Hypercholesterolemia  DM  Sinus bradycardia. Asymptomatic. Recommendations:  She is following the cholesterol with Dr. Marce Adames making changes to her medications to allow her heart rate to come up a little bit and decrease her blood pressure. However, she defers. TSH  Blood pressure and heart rate check in 2 weeks. Thank you for allowing me to participate in your patient's care.       [id number], [address] [facility]  Interventional Cardiology

## 2022-11-16 DIAGNOSIS — R00.1 BRADYCARDIA: ICD-10-CM

## 2022-11-16 LAB — TSH SERPL DL<=0.05 MIU/L-ACNC: 1.96 UIU/ML (ref 0.27–4.2)

## 2022-11-30 ENCOUNTER — NURSE ONLY (OUTPATIENT)
Dept: CARDIOLOGY CLINIC | Age: 82
End: 2022-11-30

## 2022-11-30 ENCOUNTER — TELEPHONE (OUTPATIENT)
Dept: CARDIOLOGY CLINIC | Age: 82
End: 2022-11-30

## 2022-11-30 NOTE — PROGRESS NOTES
Patient was in today for BP/HR check per Dr. Maryanne Bowers, left arm.     Sitting- 134/65 HR 56 MAP 89  Standing- 112/52 HR 56 MAP 74    MAGALIE Burgess

## 2022-11-30 NOTE — TELEPHONE ENCOUNTER
Patient was in today for BP/HR check per Dr. Yeimi Izaguirre, left arm.      Sitting- 134/65 HR 56 MAP 89  Standing- 112/52 HR 56 MAP 74     MAGALIE Burgess

## 2022-12-16 ENCOUNTER — HOSPITAL ENCOUNTER (OUTPATIENT)
Age: 82
Discharge: HOME | End: 2022-12-18

## 2023-08-04 ENCOUNTER — OFFICE VISIT (OUTPATIENT)
Dept: CARDIOLOGY CLINIC | Age: 83
End: 2023-08-04

## 2023-08-04 VITALS
DIASTOLIC BLOOD PRESSURE: 60 MMHG | HEIGHT: 62 IN | SYSTOLIC BLOOD PRESSURE: 110 MMHG | WEIGHT: 163.1 LBS | RESPIRATION RATE: 15 BRPM | BODY MASS INDEX: 30.01 KG/M2 | HEART RATE: 51 BPM

## 2023-08-04 DIAGNOSIS — I25.10 CORONARY ARTERY DISEASE DUE TO LIPID RICH PLAQUE: Primary | Chronic | ICD-10-CM

## 2023-08-04 DIAGNOSIS — I25.83 CORONARY ARTERY DISEASE DUE TO LIPID RICH PLAQUE: Primary | Chronic | ICD-10-CM

## 2023-08-04 RX ORDER — ATENOLOL 25 MG/1
25 TABLET ORAL DAILY
Qty: 90 TABLET | Refills: 3 | Status: SHIPPED | OUTPATIENT
Start: 2023-08-04

## 2023-08-04 RX ORDER — M-VIT,TX,IRON,MINS/CALC/FOLIC 27MG-0.4MG
1 TABLET ORAL DAILY
COMMUNITY

## 2023-08-04 NOTE — PROGRESS NOTES
Robbie Khalil  1940  Date of Service: 8/4/2023    Patient Active Problem List    Diagnosis Date Noted    Stool guaiac positive 04/19/2022     Priority: High    Anemia 04/18/2022     Priority: High    Severe manic bipolar 1 disorder with psychotic behavior ([facility]) 08/11/2022     Priority: Medium    Cannabis abuse 08/11/2022     Priority: Medium    Acute psychosis ([address] [place] [address]) 08/10/2022     Priority: Medium    History of COVID-19 11/12/2021     Overview Note:     Diagnosis added to problem list by Jaymie Cox based on transcribed order from Dr. Della Huff. Hypertension     Diabetes mellitus ([address] [place] [address])     CAD (coronary artery disease) 08/12/2015     Overview Note:     7-2015  2.75 x 18-mm Xience drug-eluting stent to the ostial and proximal 1st diagonal.  2.75 x 18-mm Xience drug-eluting stent to the proximal and mid LAD.    5-16   3.0x18 KARINE prox LAD        Hyperlipidemia 08/12/2015       Social History     Socioeconomic History    Marital status:      Spouse name: None    Number of children: None    Years of education: None    Highest education level: None   Tobacco Use    Smoking status: Former     Types: Cigarettes    Smokeless tobacco: Never   Vaping Use    Vaping Use: Never used   Substance and Sexual Activity    Alcohol use: No    Drug use: No       Current Outpatient Medications   Medication Sig Dispense Refill    Ascorbic Acid (MARILEE-C PO) Take by mouth daily      Multiple Vitamins-Minerals (THERAPEUTIC MULTIVITAMIN-MINERALS) tablet Take 1 tablet by mouth daily      CALCIUM CITRATE PO Take by mouth daily      ALPRAZolam (XANAX) 1 MG tablet Take 1 tablet by mouth nightly as needed.       irbesartan (AVAPRO) 150 MG tablet take 1 tablet by mouth once daily      citalopram (CELEXA) 20 MG tablet       clopidogrel (PLAVIX) 75 MG tablet Take 1 tablet by mouth daily      pantoprazole (PROTONIX) 40 MG tablet Take 1 tablet by mouth every morning (before breakfast) 30 tablet 3    Insulin Degludec 200

## 2025-06-12 ENCOUNTER — OFFICE VISIT (OUTPATIENT)
Dept: CARDIOLOGY CLINIC | Age: 85
End: 2025-06-12
Payer: MEDICARE

## 2025-06-12 VITALS
BODY MASS INDEX: 30.2 KG/M2 | HEIGHT: 62 IN | WEIGHT: 164.1 LBS | OXYGEN SATURATION: 95 % | DIASTOLIC BLOOD PRESSURE: 70 MMHG | RESPIRATION RATE: 18 BRPM | TEMPERATURE: 97.1 F | HEART RATE: 50 BPM | SYSTOLIC BLOOD PRESSURE: 122 MMHG

## 2025-06-12 DIAGNOSIS — I25.83 CORONARY ARTERY DISEASE DUE TO LIPID RICH PLAQUE: Primary | ICD-10-CM

## 2025-06-12 DIAGNOSIS — I25.10 CORONARY ARTERY DISEASE DUE TO LIPID RICH PLAQUE: Primary | ICD-10-CM

## 2025-06-12 DIAGNOSIS — I10 PRIMARY HYPERTENSION: Chronic | ICD-10-CM

## 2025-06-12 PROCEDURE — G8400 PT W/DXA NO RESULTS DOC: HCPCS | Performed by: INTERNAL MEDICINE

## 2025-06-12 PROCEDURE — G8427 DOCREV CUR MEDS BY ELIG CLIN: HCPCS | Performed by: INTERNAL MEDICINE

## 2025-06-12 PROCEDURE — 3078F DIAST BP <80 MM HG: CPT | Performed by: INTERNAL MEDICINE

## 2025-06-12 PROCEDURE — 1036F TOBACCO NON-USER: CPT | Performed by: INTERNAL MEDICINE

## 2025-06-12 PROCEDURE — 3074F SYST BP LT 130 MM HG: CPT | Performed by: INTERNAL MEDICINE

## 2025-06-12 PROCEDURE — 1124F ACP DISCUSS-NO DSCNMKR DOCD: CPT | Performed by: INTERNAL MEDICINE

## 2025-06-12 PROCEDURE — 93000 ELECTROCARDIOGRAM COMPLETE: CPT | Performed by: INTERNAL MEDICINE

## 2025-06-12 PROCEDURE — 1159F MED LIST DOCD IN RCRD: CPT | Performed by: INTERNAL MEDICINE

## 2025-06-12 PROCEDURE — G8417 CALC BMI ABV UP PARAM F/U: HCPCS | Performed by: INTERNAL MEDICINE

## 2025-06-12 PROCEDURE — G2211 COMPLEX E/M VISIT ADD ON: HCPCS | Performed by: INTERNAL MEDICINE

## 2025-06-12 PROCEDURE — 99214 OFFICE O/P EST MOD 30 MIN: CPT | Performed by: INTERNAL MEDICINE

## 2025-06-12 PROCEDURE — 1090F PRES/ABSN URINE INCON ASSESS: CPT | Performed by: INTERNAL MEDICINE

## 2025-06-12 RX ORDER — EMPAGLIFLOZIN 25 MG/1
25 TABLET, FILM COATED ORAL EVERY MORNING
COMMUNITY
Start: 2025-06-09

## 2025-06-12 RX ORDER — EZETIMIBE 10 MG/1
10 TABLET ORAL DAILY
COMMUNITY
Start: 2025-05-12

## 2025-06-12 NOTE — PROGRESS NOTES
symmetrical excursion.  Good air movement.  ABDOMEN: Soft, non-tender.  Normal bowel sounds.  Obese.  EXTREMITIES:  Full ROM x 4.  No bilateral lower extremity edema.  Good distal pulses.   EYES:  Extraocular muscles intact.  PERRL.  Normal lids & conjunctiva.  ENT:  Nares are clear & not bleeding.  Moist mucosa.  Normal lips formation.  No external masses   NEURO: no tremors, full ROM x 4, EOMI.  SKIN:  Warm, dry and intact.  Normal turgor.        EKG: Sinus bradycardia, 50 bpm, nl axis, inferior Q waves.  Nonspecific ST - T wave changes.  Poor R-wave progression.      Assessment:   Coronary artery disease as outlined above.  No ischemic symptoms.  Hypertension, well controlled today.  Hypercholesterolemia  DM  Sinus bradycardia.  Asymptomatic        Recommendations:  She is following the cholesterol with Dr. Boswell.    Continue current cardiac medications      Thank you for allowing me to participate in your patient's care.      Israel Kraus DO  [facility]  Interventional Cardiology

## (undated) DEVICE — BLOCK BITE 60FR RUBBER ADLT DENTAL

## (undated) DEVICE — FORCEPS BX OVL CUP FEN DISPOSABLE CAP L 160CM RAD JAW 4

## (undated) DEVICE — REAGENT TEST UREASE RAPD CLOTEST F/

## (undated) DEVICE — SPONGE GZ W4XL4IN RAYON POLY FILL CVR W/ NONWOVEN FAB

## (undated) DEVICE — GRADUATE TRIANG MEASURE 1000ML BLK PRNT